# Patient Record
Sex: FEMALE | Race: WHITE
[De-identification: names, ages, dates, MRNs, and addresses within clinical notes are randomized per-mention and may not be internally consistent; named-entity substitution may affect disease eponyms.]

---

## 2017-01-25 ENCOUNTER — HOSPITAL ENCOUNTER (OUTPATIENT)
Dept: HOSPITAL 83 - LAB | Age: 62
Discharge: HOME | End: 2017-01-25
Attending: INTERNAL MEDICINE
Payer: COMMERCIAL

## 2017-01-25 DIAGNOSIS — R53.81: ICD-10-CM

## 2017-01-25 DIAGNOSIS — Z00.00: Primary | ICD-10-CM

## 2017-01-25 DIAGNOSIS — R06.02: ICD-10-CM

## 2017-01-25 DIAGNOSIS — Z13.220: ICD-10-CM

## 2017-01-25 DIAGNOSIS — E55.9: ICD-10-CM

## 2017-01-25 DIAGNOSIS — I27.0: ICD-10-CM

## 2017-01-25 DIAGNOSIS — E11.65: ICD-10-CM

## 2017-01-25 DIAGNOSIS — M06.9: ICD-10-CM

## 2017-01-25 DIAGNOSIS — Z13.1: ICD-10-CM

## 2017-01-25 DIAGNOSIS — J18.9: ICD-10-CM

## 2017-01-25 DIAGNOSIS — Z13.21: ICD-10-CM

## 2017-01-25 LAB
25(OH)D3 SERPL-MCNC: 89.7 NG/ML (ref 30–100)
ALBUMIN SERPL-MCNC: 3.4 GM/DL (ref 3.1–4.5)
ALP SERPL-CCNC: 60 U/L (ref 45–117)
ALT SERPL W P-5'-P-CCNC: 20 U/L (ref 12–78)
AST SERPL-CCNC: 12 IU/L (ref 3–35)
BASOPHILS # BLD AUTO: 0.1 10*3/UL (ref 0–0.1)
BASOPHILS NFR BLD AUTO: 0.5 % (ref 0–1)
BUN SERPL-MCNC: 19 MG/DL (ref 7–24)
CHLORIDE SERPL-SCNC: 104 MMOL/L (ref 98–107)
CHOLEST SERPL-MCNC: 187 MG/DL (ref ?–200)
CO2 SERPL-SCNC: 29 MMOL/L (ref 21–32)
EOSINOPHIL # BLD AUTO: 0.2 10*3/UL (ref 0–0.4)
EOSINOPHIL # BLD AUTO: 1.9 % (ref 1–4)
ERYTHROCYTE [DISTWIDTH] IN BLOOD BY AUTOMATED COUNT: 15.3 % (ref 0–14.5)
FOLATE SERPL-MCNC: 9.67 NG/ML (ref 5.38–?)
GLUCOSE SERPL-MCNC: 98 MG/DL (ref 65–99)
HCT VFR BLD AUTO: 38.2 % (ref 37–47)
HDLC SERPL-MCNC: 76 MG/DL (ref 40–60)
HGB BLD-MCNC: 11.9 G/DL (ref 12–16)
IG #: 0 10*3/UL (ref 0–0.1)
LDLC SERPL DIRECT ASSAY-MCNC: 79 MG/DL (ref 9–159)
LYMPHOCYTES # BLD AUTO: 2.1 10*3/UL (ref 1.3–4.4)
LYMPHOCYTES NFR BLD AUTO: 21.6 % (ref 27–41)
MCH RBC QN AUTO: 27.3 PG (ref 27–31)
MCHC RBC AUTO-ENTMCNC: 31.2 G/DL (ref 33–37)
MCV RBC AUTO: 87.6 FL (ref 81–99)
MONOCYTES # BLD AUTO: 0.9 10*3/UL (ref 0.1–1)
MONOCYTES NFR BLD MANUAL: 9.2 % (ref 3–9)
NEUT #: 6.6 10*3/UL (ref 2.3–7.9)
NEUT %: 66.4 % (ref 47–73)
NRBC BLD QL AUTO: 0 % (ref 0–0)
PLATELET # BLD AUTO: 287 10*3/UL (ref 130–400)
PMV BLD AUTO: 9.5 FL (ref 9.6–12.3)
POTASSIUM SERPL-SCNC: 3.6 MMOL/L (ref 3.5–5.1)
PROT SERPL-MCNC: 7.4 GM/DL (ref 6.4–8.2)
RBC # BLD AUTO: 4.36 10*6/UL (ref 4.1–5.1)
SODIUM SERPL-SCNC: 142 MMOL/L (ref 136–145)
T4 FREE SERPL-MCNC: 1.12 NG/DL (ref 0.76–1.46)
TRIGL SERPL-MCNC: 162 MG/DL (ref ?–150)
TSH SERPL DL<=0.005 MIU/L-ACNC: 1.18 UIU/ML (ref 0.36–4.75)
VITAMIN B12: > 2000 PG/ML (ref 247–911)
VLDLC SERPL CALC-MCNC: 32 MG/DL (ref 6–40)
WBC NRBC COR # BLD AUTO: 9.9 10*3/UL (ref 4.8–10.8)

## 2017-07-21 ENCOUNTER — HOSPITAL ENCOUNTER (OUTPATIENT)
Dept: HOSPITAL 83 - US | Age: 62
Discharge: HOME | End: 2017-07-21
Payer: COMMERCIAL

## 2017-07-21 DIAGNOSIS — I65.23: Primary | ICD-10-CM

## 2017-07-21 DIAGNOSIS — R20.0: ICD-10-CM

## 2018-01-31 ENCOUNTER — HOSPITAL ENCOUNTER (OUTPATIENT)
Dept: HOSPITAL 83 - CARD | Age: 63
Discharge: HOME | End: 2018-01-31
Payer: COMMERCIAL

## 2018-01-31 DIAGNOSIS — R68.84: ICD-10-CM

## 2018-01-31 DIAGNOSIS — R06.02: Primary | ICD-10-CM

## 2018-07-12 ENCOUNTER — HOSPITAL ENCOUNTER (OUTPATIENT)
Dept: HOSPITAL 83 - CARD | Age: 63
Discharge: HOME | End: 2018-07-12
Attending: SPECIALIST
Payer: COMMERCIAL

## 2018-07-12 DIAGNOSIS — I27.20: Primary | ICD-10-CM

## 2019-04-02 ENCOUNTER — OFFICE VISIT (OUTPATIENT)
Dept: CARDIOLOGY CLINIC | Age: 64
End: 2019-04-02
Payer: MEDICARE

## 2019-04-02 VITALS
WEIGHT: 207 LBS | HEART RATE: 77 BPM | BODY MASS INDEX: 39.08 KG/M2 | HEIGHT: 61 IN | SYSTOLIC BLOOD PRESSURE: 116 MMHG | DIASTOLIC BLOOD PRESSURE: 78 MMHG | RESPIRATION RATE: 20 BRPM

## 2019-04-02 DIAGNOSIS — I20.8 EFFORT ANGINA (HCC): ICD-10-CM

## 2019-04-02 DIAGNOSIS — I10 HYPERTENSION, UNSPECIFIED TYPE: Primary | Chronic | ICD-10-CM

## 2019-04-02 PROCEDURE — 1090F PRES/ABSN URINE INCON ASSESS: CPT | Performed by: INTERNAL MEDICINE

## 2019-04-02 PROCEDURE — G8417 CALC BMI ABV UP PARAM F/U: HCPCS | Performed by: INTERNAL MEDICINE

## 2019-04-02 PROCEDURE — G8599 NO ASA/ANTIPLAT THER USE RNG: HCPCS | Performed by: INTERNAL MEDICINE

## 2019-04-02 PROCEDURE — G8400 PT W/DXA NO RESULTS DOC: HCPCS | Performed by: INTERNAL MEDICINE

## 2019-04-02 PROCEDURE — 93000 ELECTROCARDIOGRAM COMPLETE: CPT | Performed by: INTERNAL MEDICINE

## 2019-04-02 PROCEDURE — 3017F COLORECTAL CA SCREEN DOC REV: CPT | Performed by: INTERNAL MEDICINE

## 2019-04-02 PROCEDURE — 4040F PNEUMOC VAC/ADMIN/RCVD: CPT | Performed by: INTERNAL MEDICINE

## 2019-04-02 PROCEDURE — 1123F ACP DISCUSS/DSCN MKR DOCD: CPT | Performed by: INTERNAL MEDICINE

## 2019-04-02 PROCEDURE — 1036F TOBACCO NON-USER: CPT | Performed by: INTERNAL MEDICINE

## 2019-04-02 PROCEDURE — G8427 DOCREV CUR MEDS BY ELIG CLIN: HCPCS | Performed by: INTERNAL MEDICINE

## 2019-04-02 PROCEDURE — 99214 OFFICE O/P EST MOD 30 MIN: CPT | Performed by: INTERNAL MEDICINE

## 2019-04-02 RX ORDER — ALPRAZOLAM 0.5 MG/1
TABLET ORAL
Refills: 0 | COMMUNITY
Start: 2019-03-04 | End: 2020-02-04 | Stop reason: ALTCHOICE

## 2019-04-02 RX ORDER — LEFLUNOMIDE 20 MG/1
TABLET ORAL
Refills: 11 | COMMUNITY
Start: 2019-02-21

## 2019-04-02 NOTE — PROGRESS NOTES
OUTPATIENT CARDIOLOGY FOLLOW-UP    Name: Theo Duncan    Age: 71 y.o. Primary Care Physician: Wilton Stern MD    Date of Service: 4/2/2019    Chief Complaint:   Chief Complaint   Patient presents with    1 Year Follow Up     SOB, neck pain       Interim History:   Mrs. Shivam Saunders is a 58-year-old female with history of essential hypertension, rheumatoid arthritis, type II diabetes, COPD, diastolic dysfunction with heart failure with a preserved ejection fraction, pulmonary hypertension which is multifactorial WHO group 2 and 3, and non-obstructive coronary artery disease based on cardiac cath done in 2013 who is here for follow-up visit. She was seen in the office on 1/23/2018, since her last visit, he has not had any ER visits or hospitalizations. She is compliant with medications, as well as salt and fluid intake. He does not take any over-the-counter arthritis medications. She has been having exertional jaw pain , neck and bilateral shoulder pain on walking about half a block and the pain subsides over a period of 3 minutes. Pain level reaches 4-5/10. She does not have any pain. She also gets exertional dyspnea. She follows at [facility]. She denies recent  palpitations, lightheadedness, dizziness, syncope, PND, or orthopnea. SR on EKG.     Review of Systems:   Cardiac: As per HPI  General: No fever, chills  Pulmonary: As per HPI  HEENT: No visual disturbances, difficult swallowing  GI: No nausea, vomiting  Endocrine: No thyroid disease or DM  Musculoskeletal: LIMA x 4, no focal motor deficits  Skin: Intact, no rashes  Neuro/Psych: No headache or seizures    Past Medical History:  Past Medical History:   Diagnosis Date    Acute on chronic diastolic CHF (congestive heart failure) ([facility] [address] 12/18/2015    Arthritis     COPD (chronic obstructive pulmonary disease) (HCC)     Diabetes mellitus (HCC)     Hyperlipidemia     Hypertension     Other disorders of kidney and ureter     Allergies    Current Medications:  Current Outpatient Medications   Medication Sig Dispense Refill    ALPRAZolam (XANAX) 0.5 MG tablet TAKE ONE TABLET BY MOUTH TWO TIMES A DAY  0    leflunomide (ARAVA) 20 MG tablet TAKE ONE TABLET BY MOUTH DAILY  11    STRIVERDI RESPIMAT 2.5 MCG/ACT inhaler INHALE TWO PUFFS BY MOUTH ONCE A DAY  5    potassium chloride (KLOR-CON) 20 MEQ packet Take 20 mEq by mouth 2 times daily 180 each 3    furosemide (LASIX) 20 MG tablet TAKE ONE TABLET BY MOUTH TWO TIMES A DAY 60 tablet 3    carvedilol (COREG) 3.125 MG tablet Take 3.125 mg by mouth 2 times daily (with meals)       metFORMIN (GLUCOPHAGE) 500 MG tablet 2 times daily (with meals)       losartan (COZAAR) 50 MG tablet Take 50 mg by mouth daily       pantoprazole (PROTONIX) 40 MG tablet Take 40 mg by mouth daily       predniSONE (DELTASONE) 10 MG tablet Take 10 mg by mouth daily       sildenafil (REVATIO) 20 MG tablet Take 20 mg by mouth 3 times daily       escitalopram (LEXAPRO) 20 MG tablet       salmeterol (SEREVENT) 50 MCG/DOSE diskus inhaler Inhale 1 puff into the lungs 2 times daily      aspirin  MG EC tablet Take 1 tablet by mouth daily. 30 tablet 1    hydroxychloroquine (PLAQUENIL) 200 MG tablet Take 1 tablet by mouth 2 times daily. 60 tablet 0    albuterol (PROVENTIL) (2.5 MG/3ML) 0.083% nebulizer solution Take 2.5 mg by nebulization every 6 hours as needed. No current facility-administered medications for this visit.         Physical Exam:  /78   Pulse 77   Resp 20   Ht 5' 1\" (1.549 m)   Wt 207 lb (93.9 kg)   LMP 11/23/2002   BMI 39.11 kg/m²   Wt Readings from Last 3 Encounters:   04/02/19 207 lb (93.9 kg)   01/23/18 228 lb (103.4 kg)   01/05/17 217 lb (98.4 kg)     Appearance: Awake, alert and oriented x 3, no acute respiratory distress  Skin: Intact, no rash  Head: Normocephalic, atraumatic  Eyes: EOMI, no conjunctival erythema  ENMT: No pharyngeal erythema, MMM, no rhinorrhea  Neck: right ventricle size.  The right ventricle has normal function. 03) The left atrium is normal.  04) Diastolic function could not be assessed due to indeterminant data. 05) The mitral inflow E to annular E ratio is 16 (> 15 may be consistent      with increased left atrial pressure). 06) As compared to 06/16/2016 : There is no significant change. Stress test:  1/31/2018- normal stress perfusion scan with breast attenuation artifact. Cardiac catheterization: 2013- non obstructive disease. The ASCVD Risk score (Can Mcintyre., et al., 2013) failed to calculate for the following reasons:    Cannot find a previous HDL lab    Cannot find a previous total cholesterol lab        ASSESSMENT:  · Typical chest pain rule out ischemia, recent stress test  in 1/2018 showed normal perfusion with breast attenuation artifact. AUC score 8 and indication 3  · Pulmonary hypertension WHO group 2 and 3  · HFpEF   · COPD on home O2  · RA  · Essential hypertension - well controlled. · Type 2 DM  · Severe obesity      Plan:   · Will schedule for coronary cath at [address] [place] [address] to evaluate atypical chest pain, She had stress test last year and it came back as normal with breast attenuation artifact. I do not any reason repeating another stress test again. CTA coronaries is an option but she is not a candidate for nitroglycerin because she takes Revatio and nitro is contraindicated. Will schedule for cardiac cath to further evaluate her typical chest pain. · She was advised to call Dr. David Blankenship for adjusting her oxygen requirements with exertion  · Continue rest of the medications  · Will get latest Lipid panel results from Dr. Kennedy Prom office. · Follow up in 3 months      The patient's current medication list, allergies, problem list and results of all previously ordered testing were reviewed at today's visit.   Marguerite Graham MD  [facility]) Cardiology

## 2019-04-02 NOTE — PATIENT INSTRUCTIONS
· Will schedule for coronary cath at [facility] to evaluate atypical chest pain, She had stress test last year and it came back as normal with breast attenuation artifact. I do not any reason repeating the stress test again. CTA coronaries is an option but she is not a candidate for nitroglycerin because she takes Revatio and nitro is contra indicated. Will schedule for cardiac cath to further evaluate her chest pain. · She was advised to Dr. India Dill for her oxygen requirements with exertion  · Continue rest of the medications  · Will get latest Lipid panel results from Dr. Bety Mccullough office.   · Follow up in 3 months

## 2019-04-10 ENCOUNTER — TELEPHONE (OUTPATIENT)
Dept: CARDIOLOGY CLINIC | Age: 64
End: 2019-04-10

## 2019-04-11 ENCOUNTER — HOSPITAL ENCOUNTER (OUTPATIENT)
Dept: HOSPITAL 83 - LAB | Age: 64
Discharge: HOME | End: 2019-04-11
Payer: COMMERCIAL

## 2019-04-11 DIAGNOSIS — I50.33: Primary | ICD-10-CM

## 2019-04-11 DIAGNOSIS — R06.02: ICD-10-CM

## 2019-04-11 LAB
ABSOLUTE BASO #: 0.1 10*3/UL (ref 0–0.1)
ABSOLUTE EOS #: 0.3 10*3/UL (ref 0–0.4)
ABSOLUTE NEUT #: 5 10*3/UL (ref 2.3–7.9)
BASOPHILS # BLD AUTO: 0.1 10*3/UL (ref 0–0.1)
BASOPHILS %: 0.8 % (ref 0–1)
BASOPHILS NFR BLD AUTO: 0.8 % (ref 0–1)
BUN BLDV-MCNC: 26 MG/DL (ref 7–24)
BUN SERPL-MCNC: 26 MG/DL (ref 7–24)
CALCIUM SERPL-MCNC: 9.8 MG/DL (ref 8.5–10.5)
CHLORIDE BLD-SCNC: 104 MMOL/L (ref 98–107)
CHLORIDE SERPL-SCNC: 104 MMOL/L (ref 98–107)
CO2: 32 MMOL/L (ref 21–32)
CREAT SERPL-MCNC: 0.98 MG/DL (ref 0.55–1.02)
CREAT SERPL-MCNC: 0.98 MG/DL (ref 0.55–1.02)
EOSINOPHIL # BLD AUTO: 0.3 10*3/UL (ref 0–0.4)
EOSINOPHIL # BLD AUTO: 3.7 % (ref 1–4)
EOSINOPHILS %: 3.7 % (ref 1–4)
ERYTHROCYTE [DISTWIDTH] IN BLOOD BY AUTOMATED COUNT: 16.2 % (ref 0–14.5)
GFR AFRICAN AMERICAN: > 60 ML/MIN
GFR SERPL CREATININE-BSD FRML MDRD: 57 ML/MIN/
GLUCOSE: 106 MG/DL (ref 65–99)
HCT VFR BLD AUTO: 41.8 % (ref 37–47)
HCT VFR BLD CALC: 41.8 % (ref 37–47)
HEMOGLOBIN: 12.5 G/DL (ref 12–16)
HGB BLD-MCNC: 12.5 G/DL (ref 12–16)
IMMATURE GRANULOCYTES #: 0 10*3/UL (ref 0–0.1)
IMMATURE GRANULOCYTES: 0.2 % (ref 0–1)
LYMPHOCYTE %: 29.5 % (ref 27–41)
LYMPHOCYTES # BLD AUTO: 2.5 10*3/UL (ref 1.3–4.4)
LYMPHOCYTES # BLD: 2.5 10*3/UL (ref 1.3–4.4)
LYMPHOCYTES NFR BLD AUTO: 29.5 % (ref 27–41)
MCH RBC QN AUTO: 26.3 PG (ref 27–31)
MCH RBC QN AUTO: 26.3 PG (ref 27–31)
MCHC RBC AUTO-ENTMCNC: 29.9 G/DL (ref 33–37)
MCHC RBC AUTO-ENTMCNC: 29.9 G/DL (ref 33–37)
MCV RBC AUTO: 88 FL (ref 81–99)
MCV RBC AUTO: 88 FL (ref 81–99)
MONOCYTES # BLD AUTO: 0.6 10*3/UL (ref 0.1–1)
MONOCYTES # BLD: 0.6 10*3/UL (ref 0.1–1)
MONOCYTES %: 7.3 % (ref 3–9)
MONOCYTES NFR BLD MANUAL: 7.3 % (ref 3–9)
NEUT #: 5 10*3/UL (ref 2.3–7.9)
NEUT %: 58.5 % (ref 47–73)
NEUTROPHILS %: 58.5 % (ref 47–73)
NRBC BLD QL AUTO: 0 % (ref 0–0)
NUCLEATED RED BLOOD CELLS: 0 % (ref 0–0)
PDW BLD-RTO: 16.2 % (ref 0–14.5)
PLATELET # BLD AUTO: 278 10*3/UL (ref 130–400)
PLATELET # BLD: 278 10*3/UL (ref 130–400)
PMV BLD AUTO: 9.6 FL (ref 9.6–12.3)
PMV BLD AUTO: 9.6 FL (ref 9.6–12.3)
POTASSIUM SERPL-SCNC: 3.9 MMOL/L (ref 3.5–5.1)
POTASSIUM SERPL-SCNC: 3.9 MMOL/L (ref 3.5–5.1)
RBC # BLD AUTO: 4.75 10*6/UL (ref 4.1–5.1)
RBC # BLD: 4.75 10*6/UL (ref 4.1–5.1)
SODIUM BLD-SCNC: 141 MMOL/L (ref 136–145)
SODIUM SERPL-SCNC: 141 MMOL/L (ref 136–145)
WBC # BLD: 8.6 10*3/UL (ref 4.8–10.8)
WBC NRBC COR # BLD AUTO: 8.6 10*3/UL (ref 4.8–10.8)

## 2019-04-12 ENCOUNTER — HOSPITAL ENCOUNTER (OUTPATIENT)
Dept: CARDIAC CATH/INVASIVE PROCEDURES | Age: 64
Setting detail: OUTPATIENT SURGERY
Discharge: HOME | End: 2019-04-12
Payer: MEDICARE

## 2019-04-12 VITALS
BODY MASS INDEX: 38.14 KG/M2 | HEART RATE: 64 BPM | SYSTOLIC BLOOD PRESSURE: 126 MMHG | OXYGEN SATURATION: 94 % | RESPIRATION RATE: 18 BRPM | WEIGHT: 202 LBS | DIASTOLIC BLOOD PRESSURE: 82 MMHG | TEMPERATURE: 97.2 F | HEIGHT: 61 IN

## 2019-04-12 LAB
ABO/RH: NORMAL
ANTIBODY SCREEN: NORMAL

## 2019-04-12 PROCEDURE — 2580000003 HC RX 258: Performed by: INTERNAL MEDICINE

## 2019-04-12 PROCEDURE — 93458 L HRT ARTERY/VENTRICLE ANGIO: CPT | Performed by: INTERNAL MEDICINE

## 2019-04-12 PROCEDURE — 86900 BLOOD TYPING SEROLOGIC ABO: CPT

## 2019-04-12 PROCEDURE — 93571 IV DOP VEL&/PRESS C FLO 1ST: CPT | Performed by: INTERNAL MEDICINE

## 2019-04-12 PROCEDURE — 86901 BLOOD TYPING SEROLOGIC RH(D): CPT

## 2019-04-12 PROCEDURE — C1894 INTRO/SHEATH, NON-LASER: HCPCS

## 2019-04-12 PROCEDURE — 86850 RBC ANTIBODY SCREEN: CPT

## 2019-04-12 PROCEDURE — C1769 GUIDE WIRE: HCPCS

## 2019-04-12 PROCEDURE — 2500000003 HC RX 250 WO HCPCS

## 2019-04-12 PROCEDURE — 6360000002 HC RX W HCPCS

## 2019-04-12 PROCEDURE — 36415 COLL VENOUS BLD VENIPUNCTURE: CPT

## 2019-04-12 PROCEDURE — C1887 CATHETER, GUIDING: HCPCS

## 2019-04-12 PROCEDURE — 2709999900 HC NON-CHARGEABLE SUPPLY

## 2019-04-12 RX ORDER — SODIUM CHLORIDE 9 MG/ML
INJECTION, SOLUTION INTRAVENOUS CONTINUOUS
OUTPATIENT
Start: 2019-04-12

## 2019-04-12 RX ORDER — SODIUM CHLORIDE 9 MG/ML
INJECTION, SOLUTION INTRAVENOUS ONCE
Status: COMPLETED | OUTPATIENT
Start: 2019-04-12 | End: 2019-04-12

## 2019-04-12 RX ORDER — SODIUM CHLORIDE 0.9 % (FLUSH) 0.9 %
10 SYRINGE (ML) INJECTION EVERY 12 HOURS SCHEDULED
OUTPATIENT
Start: 2019-04-12

## 2019-04-12 RX ORDER — SODIUM CHLORIDE 0.9 % (FLUSH) 0.9 %
10 SYRINGE (ML) INJECTION PRN
OUTPATIENT
Start: 2019-04-12

## 2019-04-12 RX ADMIN — SODIUM CHLORIDE 20 ML/HR: 9 INJECTION, SOLUTION INTRAVENOUS at 07:43

## 2019-04-12 NOTE — OP NOTE
Indication:  1. Chest pain  2. AUC score: 8  3. AUC indication: 3    Procedure: Left Heart Catheterization, coronary angiography, left ventriculography    Anesthesia: Versed, Fentanyl  Time sedation was administered: 09:07. I was present in the room when sedation was administered. Procedure end time: 09:44  Time spent with face to face monitoring of moderate sedation: 48 min    LHC performed via right radial approach using a 6 F sheath. 2.5mg of diluted Verapamil and 200mcg of nitroglycerine administered through the sheath. 5000 U heparin administered IV. Findings:  Left main: 0%  stenosis  LAD: 0 %  stenosis  Circumflex: 0 %   stenosis  RCA: Dominant. 50 %  Stenosis. IFR 1.01; FFR 0.94  LV angio: 60%  ejection fraction    Hemodynamics:  LV: 168 mmHg. EDP: 14 No gradient across AV. Ao: 110/67 ( 84 ). Sheath removed and TR band applied. There was good hemostasis achieved and the distal pulses were intact.      Complication: None   Estimated blood loss: minimal  Contrast use: 90 cc    Post op diagnosis:  Moderate disease of the RCA; hemodynamically insignificant at present with IFR 1.01 and FFR 0.94    PLAN:  Medical therapy  Risk factor modification    Electronically signed by Sue Stanley MD on 4/12/2019 at 9:58 AM

## 2019-04-12 NOTE — PROCEDURES
[address]                  [address] [facility],  [facility]                            CARDIAC CATHETERIZATION    PATIENT NAME: LO APONTE                        :        1955  MED REC NO:   [id number]                            ROOM:  ACCOUNT NO:   [de-identified]                           ADMIT DATE: 2019  PROVIDER:     Mathew Daly MD    DATE OF PROCEDURE:  2019    PROCEDURE:  Left heart catheterization, selective coronary angiography,  pressure wire evaluation of the proximal RCA stenosis with adjunctive  intravenous adenosine administration, and left ventriculography. The procedure was done through right radial approach. The patient received intravenous Versed and intravenous fentanyl for  sedation. INDICATION:  Chest pain consistent with exertional angina. PRESSURES:  Aorta 110/67 with a mean of 84 (after the patient received  intravenous Versed, intravenous fentanyl, and intraradial verapamil and  nitroglycerin). Left ventricular systolic pressure 308. Left ventricular end-diastolic  pressure 14. There was no gradient across the aortic valve. CORONARY ANGIOGRAPHY:  LEFT MAIN:  The left main artery did not appear to have any significant  angiographic disease. LAD:  The left anterior descending artery and its branches did not  appear to have any significant angiographic disease. LCX:  The left circumflex is a large codominant vessel. The first  obtuse marginal branch is a moderate-size vessel, which did not appear  to have any significant disease. Shortly after the first obtuse  marginal branch in the proximal left circumflex, there is around 20% to  30% luminal narrowing. The second large obtuse marginal branch has  around 30% disease involving its ostium and its proximal segment.   The  distal left circumflex has an eccentric 20% to 30% luminal narrowing  before the terminal lateral branches, the posterior descending artery  branch, and the posterolateral branch. RCA:  The right coronary artery is a large codominant vessel. It has  what appears to be a raised plaque in the proximal vessel followed  shortly thereafter by around 50% luminal narrowing at the junction of  the proximal third and the middle third of the vessel. The distal  vessel did not appear to have any significant disease. LEFT VENTRICULOGRAPHY:  The left ventricle is normal in size and  contractility with an estimated ejection fraction of 60%. There  appeared to be mild aneurysmal dilatation of the ascending aorta. Pressure wire evaluation of the right coronary artery was performed. Serial IFRs were 1.01, 1.01, and 1.02 respectively. The patient was  then given IV adenosine at 140 mcg/kg/min for four minutes with an FFR  at four minutes of adenosine infusion of 0.94; findings consistent with  this RCA lesion being not hemodynamically significant. The right radial arterial sheath was removed at the end of the  procedure, and a TR band was applied with adequate hemostasis and  preservation of pulse. The patient tolerated the procedure well and left the cardiac  catheterization laboratory in a stable condition. CONCLUSIONS:  1. Coronary artery disease. a. Left main, no significant disease. b.  No significant disease. c.  LCX:  20% to 30% disease. d.  RCA:  50% proximal/mid vessel disease with IFRs of 1.01, 1.01, and  1.02, and FFR of 0.94 (lesion not hemodynamically significant). 2. Mild aneurysmal dilatation of the ascending aorta  3. Normal left ventricular size and systolic function.   4. Mildly elevated LVedp        Alistair Slade MD    D: 04/12/2019 10:11:40       T: 04/12/2019 11:14:17     [email or web address]  Job#: [id number]     Doc#: [id number]    CC:

## 2019-04-15 ENCOUNTER — TELEPHONE (OUTPATIENT)
Dept: CARDIOLOGY CLINIC | Age: 64
End: 2019-04-15

## 2019-04-15 NOTE — TELEPHONE ENCOUNTER
Marissa Song had her cath done on Friday Apr. 12 and she said she is feeling fine and just wants to know if you need to see her sooner than her scheduled appt in June?

## 2019-08-19 ENCOUNTER — TELEPHONE (OUTPATIENT)
Dept: CARDIOLOGY CLINIC | Age: 64
End: 2019-08-19

## 2019-08-20 ENCOUNTER — TELEPHONE (OUTPATIENT)
Dept: CARDIOLOGY CLINIC | Age: 64
End: 2019-08-20

## 2019-08-27 ENCOUNTER — OFFICE VISIT (OUTPATIENT)
Dept: CARDIOLOGY CLINIC | Age: 64
End: 2019-08-27
Payer: MEDICARE

## 2019-08-27 VITALS
HEART RATE: 71 BPM | BODY MASS INDEX: 38.14 KG/M2 | SYSTOLIC BLOOD PRESSURE: 124 MMHG | RESPIRATION RATE: 20 BRPM | DIASTOLIC BLOOD PRESSURE: 78 MMHG | WEIGHT: 202 LBS | HEIGHT: 61 IN

## 2019-08-27 DIAGNOSIS — I10 ESSENTIAL HYPERTENSION: Primary | Chronic | ICD-10-CM

## 2019-08-27 DIAGNOSIS — I25.10 CORONARY ARTERY DISEASE INVOLVING NATIVE CORONARY ARTERY OF NATIVE HEART WITHOUT ANGINA PECTORIS: ICD-10-CM

## 2019-08-27 PROCEDURE — 99214 OFFICE O/P EST MOD 30 MIN: CPT | Performed by: INTERNAL MEDICINE

## 2019-08-27 PROCEDURE — 93000 ELECTROCARDIOGRAM COMPLETE: CPT | Performed by: INTERNAL MEDICINE

## 2019-08-27 RX ORDER — ATORVASTATIN CALCIUM 40 MG/1
40 TABLET, FILM COATED ORAL DAILY
Qty: 90 TABLET | Refills: 3 | Status: SHIPPED | OUTPATIENT
Start: 2019-08-27

## 2019-08-27 NOTE — PROGRESS NOTES
OUTPATIENT CARDIOLOGY FOLLOW-UP    Name: Rony Almaguer    Age: 71 y.o. Primary Care Physician: Chapo Lindsey MD    Date of Service: 2019    Chief Complaint: 3 month follow up for CAD    Interim History:   70 yo F with history of HTN, pulmonary HTN (on sildenafil per her [address]), HFpEF, DM, RA. Was seen by Dr Kashmir Licea in 2019 found to have exertional chest/jaw pain, and was referred for cardiac cath at that time. Cath with Dr Forest Huang showed mild nonobstructive CAD, had FFR of RCA which was negative and will be treated medically. Currently denies CP. SOB is at baseline. Has some problem with the L ear but no longer having jaw pain. No new cardiac complaints since last cardiology evaluation. She denies recent chest pain, SOB, palpitations, lightheadedness, dizziness, syncope, PND, or orthopnea. SR on EKG.     Review of Systems:   Cardiac: As per HPI  General: No fever, chills  Pulmonary: As per HPI  HEENT: No visual disturbances, difficult swallowing  GI: No nausea, vomiting  Endocrine: No thyroid disease or DM  Musculoskeletal: LIMA x 4, no focal motor deficits  Skin: Intact, no rashes  Neuro/Psych: No headache or seizures    Past Medical History:  Past Medical History:   Diagnosis Date    Acute on chronic diastolic CHF [facility]congestive heart failure) ([facility] [address] 2015    Arthritis     COPD [facility]chronic obstructive pulmonary disease) ([facility] [address]     Diabetes mellitus (HCC)     Hyperlipidemia     Hypertension     Other disorders of kidney and ureter     Pneumonia     Unspecified cerebral artery occlusion with cerebral infarction        Past Surgical History:  Past Surgical History:   Procedure Laterality Date    ABDOMEN SURGERY      APPENDECTOMY      CARDIAC CATHETERIZATION  2019    Dr Forest Huang - FFR RCA 0.94     SECTION      x3    COLONOSCOPY      ECHOCARDIOGRAM COMPLETE 2D W DOPPLER W COLOR  2012         ENDOSCOPY, COLON, DIAGNOSTIC         Family History:  History (93.9 kg)     Appearance: Awake, alert and oriented x 3, no acute respiratory distress. On nasal cannula  Skin: Intact, no rash  Head: Normocephalic, atraumatic  Eyes: EOMI, no conjunctival erythema  ENMT: No pharyngeal erythema, MMM, no rhinorrhea  Neck: Supple, no elevated JVP, no carotid bruits  Lungs: Clear to auscultation bilaterally. No wheezes, rales, or rhonchi. Cardiac: Regular rate and rhythm, +S1S2, no murmurs apparent  Abdomen: Soft, nontender, +bowel sounds  Extremities: Moves all extremities x 4, trace lower extremity edema  Neurologic: No focal motor deficits apparent, normal mood and affect, alert and oriented x 3  Peripheral Pulses: Intact posterior tibial pulses bilaterally    Laboratory Tests:  Lab Results   Component Value Date    CREATININE 0.98 04/11/2019    BUN 26 (H) 04/11/2019     04/11/2019    K 3.9 04/11/2019     04/11/2019    CO2 32 04/11/2019     Lab Results   Component Value Date    MG 1.9 11/29/2012     Lab Results   Component Value Date    WBC 8.6 04/11/2019    HGB 12.5 04/11/2019    HCT 41.8 04/11/2019    MCV 88.0 04/11/2019     04/11/2019         No results found for: EAG    Cardiac Tests:  ECG: NSR, 70 bpm, LAFB    Echocardiogram:   Echocardiogram: 6/19/2017  01) Normal left ventricle size. Wall thickness is increased consistent with      left ventricular hypertrophy. The left ventricle has normal systolic       function. The estimated left ventricular ejection fraction is 55-60%. 02) Normal right ventricle size.  The right ventricle has normal function. 03) The left atrium is normal.  04) Diastolic function could not be assessed due to indeterminant data. 05) The mitral inflow E to annular E ratio is 16 (> 15 may be consistent      with increased left atrial pressure). 06) As compared to 06/16/2016 : There is no significant change. Stress test:  1/31/2018- normal stress perfusion scan with breast attenuation artifact.      Cardiac catheterization:

## 2019-09-10 ENCOUNTER — TELEPHONE (OUTPATIENT)
Dept: CARDIOLOGY CLINIC | Age: 64
End: 2019-09-10

## 2019-11-26 ENCOUNTER — HOSPITAL ENCOUNTER (OUTPATIENT)
Dept: HOSPITAL 83 - RAD | Age: 64
Discharge: HOME | End: 2019-11-26
Attending: INTERNAL MEDICINE
Payer: MEDICARE

## 2019-11-26 DIAGNOSIS — M85.88: Primary | ICD-10-CM

## 2019-12-11 ENCOUNTER — HOSPITAL ENCOUNTER (INPATIENT)
Dept: HOSPITAL 83 - ED | Age: 64
LOS: 4 days | Discharge: HOME | DRG: 552 | End: 2019-12-15
Attending: INTERNAL MEDICINE | Admitting: INTERNAL MEDICINE
Payer: MEDICARE

## 2019-12-11 VITALS — DIASTOLIC BLOOD PRESSURE: 83 MMHG

## 2019-12-11 VITALS — WEIGHT: 196 LBS | BODY MASS INDEX: 37 KG/M2 | HEIGHT: 60.98 IN

## 2019-12-11 VITALS — SYSTOLIC BLOOD PRESSURE: 155 MMHG | DIASTOLIC BLOOD PRESSURE: 85 MMHG

## 2019-12-11 VITALS — DIASTOLIC BLOOD PRESSURE: 93 MMHG

## 2019-12-11 VITALS — DIASTOLIC BLOOD PRESSURE: 87 MMHG

## 2019-12-11 DIAGNOSIS — M06.9: ICD-10-CM

## 2019-12-11 DIAGNOSIS — E11.22: ICD-10-CM

## 2019-12-11 DIAGNOSIS — E11.65: ICD-10-CM

## 2019-12-11 DIAGNOSIS — M48.061: Primary | ICD-10-CM

## 2019-12-11 DIAGNOSIS — J96.10: ICD-10-CM

## 2019-12-11 DIAGNOSIS — R62.7: ICD-10-CM
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
## 2019-12-11 DIAGNOSIS — Z87.891: ICD-10-CM

## 2019-12-11 DIAGNOSIS — M54.16: ICD-10-CM

## 2019-12-11 DIAGNOSIS — I12.9: ICD-10-CM

## 2019-12-11 DIAGNOSIS — T38.0X5A: ICD-10-CM

## 2019-12-11 DIAGNOSIS — N18.9: ICD-10-CM

## 2019-12-11 DIAGNOSIS — J44.9: ICD-10-CM

## 2019-12-12 VITALS — DIASTOLIC BLOOD PRESSURE: 80 MMHG | SYSTOLIC BLOOD PRESSURE: 138 MMHG

## 2019-12-12 VITALS — DIASTOLIC BLOOD PRESSURE: 94 MMHG

## 2019-12-12 VITALS — DIASTOLIC BLOOD PRESSURE: 82 MMHG | SYSTOLIC BLOOD PRESSURE: 146 MMHG

## 2019-12-12 LAB
ALBUMIN SERPL-MCNC: 3.1 GM/DL (ref 3.1–4.5)
ALP SERPL-CCNC: 60 U/L (ref 45–117)
ALT SERPL W P-5'-P-CCNC: 21 U/L (ref 12–78)
AST SERPL-CCNC: 14 IU/L (ref 3–35)
BASOPHILS # BLD AUTO: 0 10*3/UL (ref 0–0.1)
BASOPHILS NFR BLD AUTO: 0.3 % (ref 0–1)
BUN SERPL-MCNC: 22 MG/DL (ref 7–24)
CHLORIDE SERPL-SCNC: 103 MMOL/L (ref 98–107)
CREAT SERPL-MCNC: 1.41 MG/DL (ref 0.55–1.02)
EOSINOPHIL # BLD AUTO: 0 10*3/UL (ref 0–0.4)
EOSINOPHIL # BLD AUTO: 0.2 % (ref 1–4)
ERYTHROCYTE [DISTWIDTH] IN BLOOD BY AUTOMATED COUNT: 15.8 % (ref 0–14.5)
HCT VFR BLD AUTO: 43.4 % (ref 37–47)
HGB BLD-MCNC: 13 G/DL (ref 12–16)
LYMPHOCYTES # BLD AUTO: 1 10*3/UL (ref 1.3–4.4)
LYMPHOCYTES NFR BLD AUTO: 8.1 % (ref 27–41)
MCH RBC QN AUTO: 26.8 PG (ref 27–31)
MCHC RBC AUTO-ENTMCNC: 30 G/DL (ref 33–37)
MCV RBC AUTO: 89.5 FL (ref 81–99)
MONOCYTES # BLD AUTO: 0.3 10*3/UL (ref 0.1–1)
MONOCYTES NFR BLD MANUAL: 2.5 % (ref 3–9)
NEUT #: 10.5 10*3/UL (ref 2.3–7.9)
NEUT %: 88.6 % (ref 47–73)
NRBC BLD QL AUTO: 0 10*3/UL (ref 0–0)
PLATELET # BLD AUTO: 320 10*3/UL (ref 130–400)
PMV BLD AUTO: 9.4 FL (ref 9.6–12.3)
POTASSIUM SERPL-SCNC: 4.8 MMOL/L (ref 3.5–5.1)
PROT SERPL-MCNC: 7.8 GM/DL (ref 6.4–8.2)
RBC # BLD AUTO: 4.85 10*6/UL (ref 4.1–5.1)
SODIUM SERPL-SCNC: 137 MMOL/L (ref 136–145)
WBC NRBC COR # BLD AUTO: 11.8 10*3/UL (ref 4.8–10.8)

## 2019-12-13 VITALS — DIASTOLIC BLOOD PRESSURE: 88 MMHG | SYSTOLIC BLOOD PRESSURE: 144 MMHG

## 2019-12-13 VITALS — DIASTOLIC BLOOD PRESSURE: 80 MMHG | SYSTOLIC BLOOD PRESSURE: 142 MMHG

## 2019-12-13 VITALS — SYSTOLIC BLOOD PRESSURE: 141 MMHG | DIASTOLIC BLOOD PRESSURE: 47 MMHG

## 2019-12-13 VITALS — DIASTOLIC BLOOD PRESSURE: 79 MMHG

## 2019-12-14 VITALS — DIASTOLIC BLOOD PRESSURE: 80 MMHG

## 2019-12-14 VITALS — DIASTOLIC BLOOD PRESSURE: 70 MMHG

## 2019-12-14 VITALS — DIASTOLIC BLOOD PRESSURE: 98 MMHG

## 2019-12-15 VITALS — DIASTOLIC BLOOD PRESSURE: 98 MMHG

## 2019-12-15 VITALS — DIASTOLIC BLOOD PRESSURE: 78 MMHG

## 2019-12-15 LAB
BASOPHILS # BLD AUTO: 0 10*3/UL (ref 0–0.1)
BASOPHILS NFR BLD AUTO: 0.1 % (ref 0–1)
BUN SERPL-MCNC: 35 MG/DL (ref 7–24)
CHLORIDE SERPL-SCNC: 102 MMOL/L (ref 98–107)
CREAT SERPL-MCNC: 1.06 MG/DL (ref 0.55–1.02)
EOSINOPHIL # BLD AUTO: 0 % (ref 1–4)
EOSINOPHIL # BLD AUTO: 0 10*3/UL (ref 0–0.4)
ERYTHROCYTE [DISTWIDTH] IN BLOOD BY AUTOMATED COUNT: 15.9 % (ref 0–14.5)
HCT VFR BLD AUTO: 40.3 % (ref 37–47)
HGB BLD-MCNC: 11.8 G/DL (ref 12–16)
LYMPHOCYTES # BLD AUTO: 0.9 10*3/UL (ref 1.3–4.4)
LYMPHOCYTES NFR BLD AUTO: 6.9 % (ref 27–41)
MCH RBC QN AUTO: 26.4 PG (ref 27–31)
MCHC RBC AUTO-ENTMCNC: 29.3 G/DL (ref 33–37)
MCV RBC AUTO: 90.2 FL (ref 81–99)
MONOCYTES # BLD AUTO: 0.6 10*3/UL (ref 0.1–1)
MONOCYTES NFR BLD MANUAL: 5 % (ref 3–9)
NEUT #: 11 10*3/UL (ref 2.3–7.9)
NEUT %: 87.4 % (ref 47–73)
NRBC BLD QL AUTO: 0 % (ref 0–0)
PLATELET # BLD AUTO: 302 10*3/UL (ref 130–400)
PMV BLD AUTO: 10.3 FL (ref 9.6–12.3)
POTASSIUM SERPL-SCNC: 5.3 MMOL/L (ref 3.5–5.1)
RBC # BLD AUTO: 4.47 10*6/UL (ref 4.1–5.1)
SODIUM SERPL-SCNC: 136 MMOL/L (ref 136–145)
WBC NRBC COR # BLD AUTO: 12.6 10*3/UL (ref 4.8–10.8)

## 2020-01-10 ENCOUNTER — TELEPHONE (OUTPATIENT)
Dept: CARDIOLOGY CLINIC | Age: 65
End: 2020-01-10

## 2020-01-10 NOTE — TELEPHONE ENCOUNTER
Kayleigh Ugarte, received notes from [facility]. Please schedule Radha Magana with DR Clair Corbin   She is self referral PH . Received faxed notes from [address]    Pending echo disc (sent fax to them today requesting)     Thank you

## 2020-01-24 ENCOUNTER — HOSPITAL ENCOUNTER (OUTPATIENT)
Dept: HOSPITAL 83 - LAB | Age: 65
Discharge: HOME | End: 2020-01-24
Attending: INTERNAL MEDICINE
Payer: MEDICARE

## 2020-01-24 DIAGNOSIS — I27.20: ICD-10-CM

## 2020-01-24 DIAGNOSIS — E55.9: ICD-10-CM

## 2020-01-24 DIAGNOSIS — I10: Primary | ICD-10-CM

## 2020-01-24 DIAGNOSIS — Z13.21: ICD-10-CM

## 2020-01-24 DIAGNOSIS — M06.9: ICD-10-CM

## 2020-01-24 DIAGNOSIS — E11.9: ICD-10-CM

## 2020-01-24 LAB
25(OH)D3 SERPL-MCNC: 129.4 NG/ML (ref 30–100)
ABSOLUTE BASO #: 0.1 10*3/UL (ref 0–0.1)
ABSOLUTE EOS #: 0.1 10*3/UL (ref 0–0.4)
ABSOLUTE NEUT #: 8.8 10*3/UL (ref 2.3–7.9)
ALBUMIN SERPL-MCNC: 3.2 GM/DL (ref 3.1–4.5)
ALBUMIN: 3.2 GM/DL (ref 3.1–4.5)
ALP BLD-CCNC: 53 U/L (ref 45–117)
ALP SERPL-CCNC: 53 U/L (ref 45–117)
ALT SERPL W P-5'-P-CCNC: 20 U/L (ref 12–78)
ALT SERPL-CCNC: 20 U/L (ref 12–78)
AST SERPL-CCNC: 10 IU/L (ref 3–35)
AST SERPL-CCNC: 10 IU/L (ref 3–35)
BASOPHILS # BLD AUTO: 0.1 10*3/UL (ref 0–0.1)
BASOPHILS %: 0.4 % (ref 0–1)
BASOPHILS NFR BLD AUTO: 0.4 % (ref 0–1)
BILIRUB SERPL-MCNC: 0.4 MG/DL (ref 0.2–1)
BUN BLDV-MCNC: 26 MG/DL (ref 7–24)
BUN SERPL-MCNC: 26 MG/DL (ref 7–24)
CALCIUM SERPL-MCNC: 10.1 MG/DL (ref 8.5–10.5)
CHLORIDE BLD-SCNC: 104 MMOL/L (ref 98–107)
CHLORIDE SERPL-SCNC: 104 MMOL/L (ref 98–107)
CHOLEST SERPL-MCNC: 159 MG/DL (ref ?–200)
CHOLESTEROL: 159 MG/DL
CO2: 30 MMOL/L (ref 21–32)
CREAT SERPL-MCNC: 1.4 MG/DL (ref 0.55–1.02)
CREAT SERPL-MCNC: 1.4 MG/DL (ref 0.55–1.02)
EOSINOPHIL # BLD AUTO: 0.1 10*3/UL (ref 0–0.4)
EOSINOPHIL # BLD AUTO: 1.1 % (ref 1–4)
EOSINOPHILS %: 1.1 % (ref 1–4)
ERYTHROCYTE SEDIMENTATION RATE: 35 MM/HR (ref 0–30)
ERYTHROCYTE [DISTWIDTH] IN BLOOD BY AUTOMATED COUNT: 15.9 % (ref 0–14.5)
FOLIC ACID, SERUM: 10.03 NG/ML
GFR AFRICAN AMERICAN: 46 ML/MIN
GFR SERPL CREATININE-BSD FRML MDRD: 38 ML/MIN/
GLUCOSE: 199 MG/DL (ref 65–99)
HCT VFR BLD AUTO: 40.6 % (ref 37–47)
HCT VFR BLD CALC: 40.6 % (ref 37–47)
HDLC SERPL-MCNC: 75 MG/DL (ref 40–60)
HDLC SERPL-MCNC: 75 MG/DL (ref 40–60)
HEMOGLOBIN: 12 G/DL (ref 12–16)
HGB BLD-MCNC: 12 G/DL (ref 12–16)
IMMATURE GRANULOCYTES #: 0.1 10*3/UL (ref 0–0.1)
IMMATURE GRANULOCYTES: 0.6 % (ref 0–1)
LDL CHOLESTEROL: 51 MG/DL (ref 9–159)
LDLC SERPL DIRECT ASSAY-MCNC: 51 MG/DL (ref 9–159)
LYMPHOCYTE %: 15 % (ref 27–41)
LYMPHOCYTES # BLD AUTO: 1.7 10*3/UL (ref 1.3–4.4)
LYMPHOCYTES # BLD: 1.7 10*3/UL (ref 1.3–4.4)
LYMPHOCYTES NFR BLD AUTO: 15 % (ref 27–41)
MCH RBC QN AUTO: 26.5 PG (ref 27–31)
MCH RBC QN AUTO: 26.5 PG (ref 27–31)
MCHC RBC AUTO-ENTMCNC: 29.6 G/DL (ref 33–37)
MCHC RBC AUTO-ENTMCNC: 29.6 G/DL (ref 33–37)
MCV RBC AUTO: 89.8 FL (ref 81–99)
MCV RBC AUTO: 89.8 FL (ref 81–99)
MONOCYTES # BLD AUTO: 0.8 10*3/UL (ref 0.1–1)
MONOCYTES # BLD: 0.8 10*3/UL (ref 0.1–1)
MONOCYTES %: 6.5 % (ref 3–9)
MONOCYTES NFR BLD MANUAL: 6.5 % (ref 3–9)
NEUT #: 8.8 10*3/UL (ref 2.3–7.9)
NEUT %: 76.4 % (ref 47–73)
NEUTROPHILS %: 76.4 % (ref 47–73)
NRBC BLD QL AUTO: 0 % (ref 0–0)
NUCLEATED RED BLOOD CELLS: 0 % (ref 0–0)
PDW BLD-RTO: 15.9 % (ref 0–14.5)
PLATELET # BLD AUTO: 312 10*3/UL (ref 130–400)
PLATELET # BLD: 312 10*3/UL (ref 130–400)
PMV BLD AUTO: 9.6 FL (ref 9.6–12.3)
PMV BLD AUTO: 9.6 FL (ref 9.6–12.3)
POTASSIUM SERPL-SCNC: 3.8 MMOL/L (ref 3.5–5.1)
POTASSIUM SERPL-SCNC: 3.8 MMOL/L (ref 3.5–5.1)
PROT SERPL-MCNC: 7.4 GM/DL (ref 6.4–8.2)
RBC # BLD AUTO: 4.52 10*6/UL (ref 4.1–5.1)
RBC # BLD: 4.52 10*6/UL (ref 4.1–5.1)
SODIUM BLD-SCNC: 135 MMOL/L (ref 136–145)
SODIUM SERPL-SCNC: 135 MMOL/L (ref 136–145)
T4 FREE SERPL-MCNC: 1.09 NG/DL (ref 0.76–1.46)
T4 FREE: 1.09 NG/DL (ref 0.76–1.46)
TOTAL PROTEIN: 7.4 GM/DL (ref 6.4–8.2)
TRIGL SERPL-MCNC: 167 MG/DL
TRIGL SERPL-MCNC: 167 MG/DL (ref ?–150)
TSH SERPL DL<=0.005 MIU/L-ACNC: 0.83 UIU/ML (ref 0.36–4.75)
TSH SERPL DL<=0.05 MIU/L-ACNC: 0.83 UIU/ML (ref 0.36–4.75)
VITAMIN B-12: > 2000 PG/ML (ref 247–911)
VITAMIN B12: > 2000 PG/ML (ref 247–911)
VITAMIN D 25-HYDROXY: 129.4 NG/ML (ref 30–100)
VLDLC SERPL CALC-MCNC: 33 MG/DL (ref 6–40)
VLDLC SERPL CALC-MCNC: 33 MG/DL (ref 6–40)
WBC # BLD: 11.5 10*3/UL (ref 4.8–10.8)
WBC NRBC COR # BLD AUTO: 11.5 10*3/UL (ref 4.8–10.8)

## 2020-02-04 ENCOUNTER — OFFICE VISIT (OUTPATIENT)
Dept: CARDIOLOGY CLINIC | Age: 65
End: 2020-02-04
Payer: MEDICARE

## 2020-02-04 VITALS
HEIGHT: 60 IN | SYSTOLIC BLOOD PRESSURE: 100 MMHG | DIASTOLIC BLOOD PRESSURE: 58 MMHG | RESPIRATION RATE: 20 BRPM | BODY MASS INDEX: 40.05 KG/M2 | WEIGHT: 204 LBS | TEMPERATURE: 97.6 F | HEART RATE: 93 BPM | OXYGEN SATURATION: 96 %

## 2020-02-04 LAB
DISTANCE WALKED: NORMAL
DISTANCE WALKED: NORMAL
SPO2: NORMAL
SPO2: NORMAL

## 2020-02-04 PROCEDURE — 1123F ACP DISCUSS/DSCN MKR DOCD: CPT | Performed by: INTERNAL MEDICINE

## 2020-02-04 PROCEDURE — G8417 CALC BMI ABV UP PARAM F/U: HCPCS | Performed by: INTERNAL MEDICINE

## 2020-02-04 PROCEDURE — 1090F PRES/ABSN URINE INCON ASSESS: CPT | Performed by: INTERNAL MEDICINE

## 2020-02-04 PROCEDURE — G8427 DOCREV CUR MEDS BY ELIG CLIN: HCPCS | Performed by: INTERNAL MEDICINE

## 2020-02-04 PROCEDURE — 99205 OFFICE O/P NEW HI 60 MIN: CPT | Performed by: INTERNAL MEDICINE

## 2020-02-04 PROCEDURE — G8400 PT W/DXA NO RESULTS DOC: HCPCS | Performed by: INTERNAL MEDICINE

## 2020-02-04 PROCEDURE — 93000 ELECTROCARDIOGRAM COMPLETE: CPT | Performed by: INTERNAL MEDICINE

## 2020-02-04 PROCEDURE — 94618 PULMONARY STRESS TESTING: CPT | Performed by: INTERNAL MEDICINE

## 2020-02-04 PROCEDURE — 3017F COLORECTAL CA SCREEN DOC REV: CPT | Performed by: INTERNAL MEDICINE

## 2020-02-04 PROCEDURE — G8484 FLU IMMUNIZE NO ADMIN: HCPCS | Performed by: INTERNAL MEDICINE

## 2020-02-04 PROCEDURE — 4040F PNEUMOC VAC/ADMIN/RCVD: CPT | Performed by: INTERNAL MEDICINE

## 2020-02-04 PROCEDURE — 1036F TOBACCO NON-USER: CPT | Performed by: INTERNAL MEDICINE

## 2020-02-04 RX ORDER — LORATADINE 10 MG/1
10 TABLET ORAL DAILY
COMMUNITY

## 2020-02-04 RX ORDER — ALBUTEROL SULFATE 90 UG/1
2 AEROSOL, METERED RESPIRATORY (INHALATION) EVERY 6 HOURS PRN
COMMUNITY

## 2020-02-04 NOTE — PROGRESS NOTES
ONE TABLET BY MOUTH DAILY  11    potassium chloride (KLOR-CON) 20 MEQ packet Take 20 mEq by mouth 2 times daily (Patient taking differently: Take 20 mEq by mouth daily ) 180 each 3    furosemide (LASIX) 20 MG tablet TAKE ONE TABLET BY MOUTH TWO TIMES A DAY 60 tablet 3    carvedilol (COREG) 3.125 MG tablet Take 3.125 mg by mouth 2 times daily (with meals)       metFORMIN (GLUCOPHAGE) 500 MG tablet Take 500 mg by mouth 2 times daily (with meals)       losartan (COZAAR) 50 MG tablet Take 50 mg by mouth daily       pantoprazole (PROTONIX) 40 MG tablet Take 40 mg by mouth daily       predniSONE (DELTASONE) 10 MG tablet Take 10 mg by mouth daily       sildenafil (REVATIO) 20 MG tablet Take 20 mg by mouth 3 times daily       escitalopram (LEXAPRO) 20 MG tablet Take 20 mg by mouth daily       salmeterol (SEREVENT) 50 MCG/DOSE diskus inhaler Inhale 2 puffs into the lungs daily       hydroxychloroquine (PLAQUENIL) 200 MG tablet Take 1 tablet by mouth 2 times daily. 60 tablet 0         Review of Systems:   Cardiac: As per HPI  General: No fever, chills, rigors  Pulmonary: As per HPI  HEENT: No visual disturbances, difficult swallowing  GI: No nausea, vomiting, abdominal pain  : No dysuria or hematuria  Endocrine: No thyroid disease or diabetes  Musculoskeletal: LIMA x 4, no focal motor deficits  Skin: Intact, no rashes  Neuro/Psych: No headache or seizures          Weights:   Wt Readings from Last 3 Encounters:   02/04/20 204 lb (92.5 kg)   08/27/19 202 lb (91.6 kg)   04/12/19 202 lb (91.6 kg)     Additional Measurements    02/04/20 0959   Neck circumference: 16     Physical Examination:   BP (!) 100/58 (Site: Right Upper Arm, Position: Sitting, Cuff Size: Large Adult)   Pulse 93   Temp 97.6 °F (36.4 °C)   Resp 20   Ht 5' (1.524 m)   Wt 204 lb (92.5 kg)   LMP 11/23/2002   SpO2 96% Comment: on 2L O2  BMI 39.84 kg/m²   CONSTITUTIONAL: Alert and oriented times 3, no acute distress and cooperative to examination with proper mood and affect. SKIN: Skin color, texture, turgor normal. No rashes or lesions. LYMPH: no cervical nodes, no inguinal nodes  HEENT: Head is normocephalic, atraumatic. EOMI, PERRLA. NECK: no JVD. CHEST/LUNGS: chest symmetric with normal A/P diameter, normal respiratory rate and rhythm, lungs clear to auscultation without wheezes, rales or rhonchi. No accessory muscle use. Scars None   CARDIOVASCULAR: Heart sounds are normal.  Regular rate and rhythm without murmur, gallop or rub. Normal S1 and S2. . Carotid and femoral pulses 2+/4 and equal bilaterally. ABDOMEN: Normal shape. No and Laparoscopic scar(s) present. Normal bowel sounds. No bruits. soft, nondistended, no masses or organomegaly. no evidence of hernia. Percussion: Normal without hepatosplenomegally. Tenderness: absent. RECTAL: deferred, not clinically indicated  NEUROLOGIC: There are no focalizing motor or sensory deficits. CN II-XII are grossly intact. Bianca Siskin EXTREMITIES: no cyanosis, no clubbing and no edema. All the following diagnostics were personally reviewed and interpreted by me.        Lab Data:     1/24/2020 13:36   Sodium 135 (L)   Potassium 3.8   Chloride 104   CO2 30   BUN 26 (H)   Creatinine 1.40 (H)   Est, Glom Filt Rate 38 (L)   GFR African American 46 (L)   Glucose 199 (H)   Calcium 10.1   Total Protein 7.4   Cholesterol 159   HDL Cholesterol 75 (H)   LDL Cholesterol 51   Triglycerides 167 (H)   VLDL 33   Albumin 3.2   Alk Phos 53   ALT 20   AST 10   Bilirubin 0.4   TSH 0.827   T4 Free 1.09   Vit D, 25-Hydroxy 129.4 (H)   WBC 11.5 (H)   RBC 4.52   Hemoglobin Quant 12.0   Hematocrit 40.6   MCV 89.8   MCH 26.5 (L)   MCHC 29.6 (L)   MPV 9.6   RDW 15.9 (H)   Platelet Count 651   Lymphocyte % 15.0 (L)   Eosinophils % 1.1   Immature Granulocytes # 0.1   Basophils Absolute 0.1   Absolute Neut # 8.8 (H)   Neutrophils % 76.4 (H)   Lymphocytes 1.7   Monocytes 0.8   Absolute Eos # 0.1   Immature Granulocytes 0.6   Nucleated Red Blood Cells 0.0   Vitamin B-12 > 2000 (H)   Sed Rate 35 (H)   Basophils % 0.4   Folic Acid, Serum 01.78   Monocytes % 6.5             6 minute walk test:  800 feet  No desaturations on 3 l/min  Good hr and bp response  Normal hrr   Alex score    ECG: nsr        PAH Guideline directed medical therapy:  PDE-5i: Yes  ERA:  No  Prostacyclin:  No  Oral NA IV  NA Subcutaneous  NA   Prostacyclin receptor agonist: No  Soluble guanylate cyclase stimulator: No  Spironolactone: No  Digoxin: No  Calcium channel blocker: No  Referral to psychiatry/therapy/counseling: No        Assessment:   1. Chronic hfpef  2. Euvolemic, nyha fc 2-3  3. Who group 3 and 2 pulmonary arterial hypertension on monotherapy  4. Chronic hypoxia  5. COPD  6. JOEY, CPAP compliant  7. Systemic hypertension  8. Obesity         Plan:   1. Echocardiogram in the [facility]      2. Blood work      3. Return visit in May 2020    I spent > 60 minutes reviewing the entire medical record, diagnostics, counseling regarding prognosis. Mike Acosta M.D.  [facility]  Heart Failure and Pulmonary Hypertension  Mobile [phone]

## 2020-03-25 ENCOUNTER — TELEPHONE (OUTPATIENT)
Dept: CARDIOLOGY | Age: 65
End: 2020-03-25

## 2020-04-06 ENCOUNTER — TELEPHONE (OUTPATIENT)
Dept: CARDIOLOGY CLINIC | Age: 65
End: 2020-04-06

## 2020-04-07 ENCOUNTER — TELEPHONE (OUTPATIENT)
Dept: CARDIOLOGY CLINIC | Age: 65
End: 2020-04-07

## 2020-05-18 ENCOUNTER — HOSPITAL ENCOUNTER (OUTPATIENT)
Dept: HOSPITAL 83 - LAB | Age: 65
Discharge: HOME | End: 2020-05-18
Attending: INTERNAL MEDICINE
Payer: MEDICARE

## 2020-05-18 DIAGNOSIS — I50.33: Primary | ICD-10-CM

## 2020-05-18 DIAGNOSIS — I27.20: ICD-10-CM

## 2020-05-18 DIAGNOSIS — R06.02: ICD-10-CM

## 2020-05-18 LAB
BUN BLDV-MCNC: 21 MG/DL (ref 7–24)
BUN SERPL-MCNC: 21 MG/DL (ref 7–24)
CALCIUM SERPL-MCNC: 9.5 MG/DL (ref 8.5–10.5)
CHLORIDE BLD-SCNC: 106 MMOL/L (ref 98–107)
CHLORIDE SERPL-SCNC: 106 MMOL/L (ref 98–107)
CO2: 31 MMOL/L (ref 21–32)
CREAT SERPL-MCNC: 1.17 MG/DL (ref 0.55–1.02)
CREAT SERPL-MCNC: 1.17 MG/DL (ref 0.55–1.02)
GFR AFRICAN AMERICAN: 56 ML/MIN
GFR SERPL CREATININE-BSD FRML MDRD: 46 ML/MIN/
GLUCOSE: 159 MG/DL (ref 65–99)
POTASSIUM SERPL-SCNC: 4 MMOL/L (ref 3.5–5.1)
POTASSIUM SERPL-SCNC: 4 MMOL/L (ref 3.5–5.1)
PRO BNP, N-TERMINAL: 378 PG/ML (ref 0–125)
SODIUM BLD-SCNC: 140 MMOL/L (ref 136–145)
SODIUM SERPL-SCNC: 140 MMOL/L (ref 136–145)

## 2020-05-19 ENCOUNTER — OFFICE VISIT (OUTPATIENT)
Dept: CARDIOLOGY CLINIC | Age: 65
End: 2020-05-19
Payer: MEDICARE

## 2020-05-19 VITALS
TEMPERATURE: 97.3 F | OXYGEN SATURATION: 94 % | HEART RATE: 74 BPM | SYSTOLIC BLOOD PRESSURE: 132 MMHG | DIASTOLIC BLOOD PRESSURE: 68 MMHG | RESPIRATION RATE: 16 BRPM | HEIGHT: 60 IN | BODY MASS INDEX: 40.4 KG/M2 | WEIGHT: 205.8 LBS

## 2020-05-19 PROCEDURE — 3017F COLORECTAL CA SCREEN DOC REV: CPT | Performed by: INTERNAL MEDICINE

## 2020-05-19 PROCEDURE — 93000 ELECTROCARDIOGRAM COMPLETE: CPT | Performed by: INTERNAL MEDICINE

## 2020-05-19 PROCEDURE — G8926 SPIRO NO PERF OR DOC: HCPCS | Performed by: INTERNAL MEDICINE

## 2020-05-19 PROCEDURE — 1090F PRES/ABSN URINE INCON ASSESS: CPT | Performed by: INTERNAL MEDICINE

## 2020-05-19 PROCEDURE — G8427 DOCREV CUR MEDS BY ELIG CLIN: HCPCS | Performed by: INTERNAL MEDICINE

## 2020-05-19 PROCEDURE — 4040F PNEUMOC VAC/ADMIN/RCVD: CPT | Performed by: INTERNAL MEDICINE

## 2020-05-19 PROCEDURE — 1123F ACP DISCUSS/DSCN MKR DOCD: CPT | Performed by: INTERNAL MEDICINE

## 2020-05-19 PROCEDURE — 99214 OFFICE O/P EST MOD 30 MIN: CPT | Performed by: INTERNAL MEDICINE

## 2020-05-19 PROCEDURE — G8400 PT W/DXA NO RESULTS DOC: HCPCS | Performed by: INTERNAL MEDICINE

## 2020-05-19 PROCEDURE — 3023F SPIROM DOC REV: CPT | Performed by: INTERNAL MEDICINE

## 2020-05-19 PROCEDURE — G8417 CALC BMI ABV UP PARAM F/U: HCPCS | Performed by: INTERNAL MEDICINE

## 2020-05-19 PROCEDURE — 1036F TOBACCO NON-USER: CPT | Performed by: INTERNAL MEDICINE

## 2020-05-19 NOTE — PROGRESS NOTES
Absolute 0.1     Absolute Neut # 8.8 (H)     Neutrophils % 76.4 (H)     Lymphocytes 1.7     Monocytes 0.8     Absolute Eos # 0.1     Immature Granulocytes 0.6     Nucleated Red Blood Cells 0.0     Vitamin B-12 > 2000 (H)     Sed Rate 35 (H)     EKG 12-LEAD  Attch    Basophils % 0.4     Folic Acid, Serum 16.80     Monocytes % 6.5     proBNP, N-Terminal   378.00 (H)           6 minute walk test:  800 feet  No desaturations on 3 l/min  Good hr and bp response  Normal HRR  Alex score        ECG: NSR, LAHB, PACs        PAH Guideline directed medical therapy:  PDE-5i: Yes  ERA:  No  Prostacyclin:  No  Oral NA IV  NA Subcutaneous  NA   Prostacyclin receptor agonist: No  Soluble guanylate cyclase stimulator: No  Spironolactone: No  Digoxin: No  Calcium channel blocker: No  Referral to psychiatry/therapy/counseling: No        Assessment:   1. Chronic HFpEF  2. Euvolemic, NYHA FC 1-2  3. Who group 3 and 2 pulmonary arterial hypertension on monotherapy  4. Chronic hypoxia  5. COPD  6. JOEY, CPAP compliant  7. Systemic hypertension  8. Obesity         Plan:   1. Referral to [facility] for [address], per patient request  2. FU with them in September 2020 / Carrie Vyas M.D.  3. No change in medications now  4. Diet should sodium restricted to 2-4 grams a day. Again watch your daily weight trends and if you gain water weight please follow below instructions. If you gain 3-5 pounds in 2-3 days OR notice that you are retaining fluid in anyway just like you did before then take an extra dose of your water pill (furosemide/Lasix OR bumetanide/Bumex) every day until you lose the weight or feel better. If you notice that you have taken more than 3 extra doses in 1 week then please call and let us know. If at any time you feel that you are retaining fluid, your medications are not working, or you feel ill in anyway, then please call us for either same day appointment or the next day, and for instructions.  Our goal is to keep you out

## 2020-06-03 ENCOUNTER — HOSPITAL ENCOUNTER (OUTPATIENT)
Dept: CARDIOLOGY | Age: 65
Discharge: HOME | End: 2020-06-03
Payer: MEDICARE

## 2020-06-03 LAB
LV EF: 60 %
LVEF MODALITY: NORMAL

## 2020-06-03 PROCEDURE — 93306 TTE W/DOPPLER COMPLETE: CPT

## 2020-06-08 ENCOUNTER — TELEPHONE (OUTPATIENT)
Dept: CARDIOLOGY CLINIC | Age: 65
End: 2020-06-08

## 2020-07-27 RX ORDER — GABAPENTIN 300 MG/1
1 CAPSULE ORAL NIGHTLY
COMMUNITY
Start: 2020-05-11

## 2020-07-28 ENCOUNTER — OFFICE VISIT (OUTPATIENT)
Dept: CARDIOLOGY CLINIC | Age: 65
End: 2020-07-28
Payer: MEDICARE

## 2020-07-28 VITALS
RESPIRATION RATE: 22 BRPM | BODY MASS INDEX: 40.64 KG/M2 | WEIGHT: 207 LBS | HEIGHT: 60 IN | HEART RATE: 74 BPM | DIASTOLIC BLOOD PRESSURE: 74 MMHG | SYSTOLIC BLOOD PRESSURE: 122 MMHG

## 2020-07-28 PROCEDURE — 93000 ELECTROCARDIOGRAM COMPLETE: CPT | Performed by: INTERNAL MEDICINE

## 2020-07-28 PROCEDURE — 99214 OFFICE O/P EST MOD 30 MIN: CPT | Performed by: INTERNAL MEDICINE

## 2020-07-28 NOTE — PROGRESS NOTES
Bromide 0.5mg + Albuterol Sulfate 3mg by nebulizer tid, Disp: , Rfl:     albuterol sulfate HFA (PROAIR HFA) 108 (90 Base) MCG/ACT inhaler, Inhale 2 puffs into the lungs every 6 hours as needed for Wheezing or Shortness of Breath, Disp: , Rfl:     loratadine (CLARITIN) 10 MG tablet, Take 10 mg by mouth daily, Disp: , Rfl:     NONFORMULARY, Jayy Red 500mg  One daily, Disp: , Rfl:     Bioflavonoid Products (ANSELMO C PO), Take 1,000 mg by mouth daily, Disp: , Rfl:     OXYGEN, Inhale 2 L into the lungs continuous , Disp: , Rfl:     aspirin 81 MG tablet, Take 81 mg by mouth daily, Disp: , Rfl:     atorvastatin (LIPITOR) 40 MG tablet, Take 1 tablet by mouth daily (Patient taking differently: Take 20 mg by mouth daily ), Disp: 90 tablet, Rfl: 3    leflunomide (ARAVA) 20 MG tablet, TAKE ONE TABLET BY MOUTH DAILY, Disp: , Rfl: 11    potassium chloride (KLOR-CON) 20 MEQ packet, Take 20 mEq by mouth 2 times daily (Patient taking differently: Take 20 mEq by mouth daily ), Disp: 180 each, Rfl: 3    furosemide (LASIX) 20 MG tablet, TAKE ONE TABLET BY MOUTH TWO TIMES A DAY, Disp: 60 tablet, Rfl: 3    carvedilol (COREG) 3.125 MG tablet, Take 3.125 mg by mouth 2 times daily (with meals) , Disp: , Rfl:     metFORMIN (GLUCOPHAGE) 500 MG tablet, Take 500 mg by mouth 2 times daily (with meals) , Disp: , Rfl:     losartan (COZAAR) 50 MG tablet, Take 50 mg by mouth daily , Disp: , Rfl:     pantoprazole (PROTONIX) 40 MG tablet, Take 40 mg by mouth daily , Disp: , Rfl:     predniSONE (DELTASONE) 10 MG tablet, Take 10 mg by mouth daily , Disp: , Rfl:     sildenafil (REVATIO) 20 MG tablet, Take 20 mg by mouth 3 times daily , Disp: , Rfl:     escitalopram (LEXAPRO) 20 MG tablet, Take 20 mg by mouth daily , Disp: , Rfl:     salmeterol (SEREVENT) 50 MCG/DOSE diskus inhaler, Inhale 2 puffs into the lungs daily , Disp: , Rfl:     hydroxychloroquine (PLAQUENIL) 200 MG tablet, Take 1 tablet by mouth 2 times daily. , Disp: 60 tablet, indeterminant data. 05) The mitral inflow E to annular E ratio is 16 (> 15 may be consistent      with increased left atrial pressure). 06) As compared to 06/16/2016 : There is no significant change. Echocardiogram June/2020  Normal LV function EF 60% with stage I diastolic dysfunction  Normal RV function  Mild left atrial dilation  RVSP 50 mmHg  Small circumferential pericardial effusion    Stress test:  1/31/2018- normal stress perfusion scan with breast attenuation artifact. Cardiac catheterization:   4/2019  CONCLUSIONS:  1. Coronary artery disease. a. Left main, no significant disease. b.  No significant disease. c.  LCX:  20% to 30% disease. d.  RCA:  50% proximal/mid vessel disease with IFRs of 1.01, 1.01, and  1.02, and FFR of 0.94 (lesion not hemodynamically significant). 2. Mild aneurysmal dilatation of the ascending aorta  3. Normal left ventricular size and systolic function. 4. Mildly elevated LVedp       ASSESSMENT / PLAN:    1. Chronic HFpEF, compensated  2. NYHA class III  3. WHO group 2 and 3 pulmonary hypertension, on sildenafil monotherapy  4. Mild nonobstructive coronary artery disease  5. Small pericardial effusion  6. Left anterior fascicular block  7. Chronic hypoxic respiratory failure on continuous O2  8. Comorbid disease: Hypertension, type 2 diabetes, rheumatoid arthritis, obstructive sleep apnea, obesity BMI 40.4 kg/m², COPD    Recommendations:  Doing well from cardiac standpoint. Plan to follow locally for now, sees Dr. Gisselle Candelaria for pulmonary and will see me for cardiology needs.     · Continue aspirin and statin (tolerating atorvastatin 20) for CAD  · Continue furosemide, losartan, carvedilol  · Continue sildenafil, to be managed by Dr. Gisselle Candelaria  · Aggressive risk factor modification  · Follow-up in 6 months or sooner if need arises    The patient's current medication list, allergies, problem list and results of all previously ordered testing were reviewed at today's visit.     Gwen Mcintosh MD  [facility] ([facility]) Cardiology

## 2021-01-19 ENCOUNTER — OFFICE VISIT (OUTPATIENT)
Dept: CARDIOLOGY CLINIC | Age: 66
End: 2021-01-19
Payer: MEDICARE

## 2021-01-19 ENCOUNTER — HOSPITAL ENCOUNTER (OUTPATIENT)
Dept: HOSPITAL 83 - LAB | Age: 66
Discharge: HOME | End: 2021-01-19
Attending: INTERNAL MEDICINE
Payer: MEDICARE

## 2021-01-19 VITALS
HEIGHT: 60 IN | RESPIRATION RATE: 22 BRPM | DIASTOLIC BLOOD PRESSURE: 68 MMHG | WEIGHT: 214 LBS | HEART RATE: 100 BPM | BODY MASS INDEX: 42.01 KG/M2 | SYSTOLIC BLOOD PRESSURE: 126 MMHG

## 2021-01-19 DIAGNOSIS — J43.9: Primary | ICD-10-CM

## 2021-01-19 DIAGNOSIS — I10 ESSENTIAL HYPERTENSION: Primary | Chronic | ICD-10-CM

## 2021-01-19 DIAGNOSIS — E78.00: ICD-10-CM

## 2021-01-19 PROCEDURE — 1090F PRES/ABSN URINE INCON ASSESS: CPT | Performed by: INTERNAL MEDICINE

## 2021-01-19 PROCEDURE — G8427 DOCREV CUR MEDS BY ELIG CLIN: HCPCS | Performed by: INTERNAL MEDICINE

## 2021-01-19 PROCEDURE — 1036F TOBACCO NON-USER: CPT | Performed by: INTERNAL MEDICINE

## 2021-01-19 PROCEDURE — 4040F PNEUMOC VAC/ADMIN/RCVD: CPT | Performed by: INTERNAL MEDICINE

## 2021-01-19 PROCEDURE — 1123F ACP DISCUSS/DSCN MKR DOCD: CPT | Performed by: INTERNAL MEDICINE

## 2021-01-19 PROCEDURE — G8400 PT W/DXA NO RESULTS DOC: HCPCS | Performed by: INTERNAL MEDICINE

## 2021-01-19 PROCEDURE — 3017F COLORECTAL CA SCREEN DOC REV: CPT | Performed by: INTERNAL MEDICINE

## 2021-01-19 PROCEDURE — 99214 OFFICE O/P EST MOD 30 MIN: CPT | Performed by: INTERNAL MEDICINE

## 2021-01-19 PROCEDURE — G8417 CALC BMI ABV UP PARAM F/U: HCPCS | Performed by: INTERNAL MEDICINE

## 2021-01-19 PROCEDURE — 93000 ELECTROCARDIOGRAM COMPLETE: CPT | Performed by: INTERNAL MEDICINE

## 2021-01-19 PROCEDURE — G8484 FLU IMMUNIZE NO ADMIN: HCPCS | Performed by: INTERNAL MEDICINE

## 2021-01-19 NOTE — PROGRESS NOTES
OUTPATIENT CARDIOLOGY FOLLOW-UP    Name: Kane Gutierrez    Age: 76 y.o. Primary Care Physician: Shine Belle MD    Date of Service: 2021    Chief Complaint: 3 month follow up for CAD    Interim History:   72 yo F with history of HTN, pulmonary HTN WHO group 2 and 3, previously followed with [facility] specialist at [facility] and is on sildenafil. She saw Dr. Caroline Smiley a few times as well prior to her departure. She has mild nonobstructive CAD by heart cath 2019. She presents today for follow-up. Overall doing okay. She has stayed out of the hospital.  She has persistent chronic dyspnea with minimal exertion. She is on 4 L nasal cannula continuously. She feels her breathing may be slightly worse. Jessica Salm Has gained about 7 pounds over the past 6 months, does not think she is retaining fluid. Jessica Salm No chest pain. Review of Systems:   Negative except as scribed above    Past Medical History:  Past Medical History:   Diagnosis Date    Acute on chronic diastolic CHF (congestive heart failure) ([address] 2015    Arthritis     CAD (coronary artery disease)     COPD (chronic obstructive pulmonary disease) (HCC)     Diabetes mellitus (HCC)     Hyperlipidemia     Hypertension     Other disorders of kidney and ureter     Pneumonia     Pulmonary hypertension (HCC)     RA (rheumatoid arthritis) ([address]     Unspecified cerebral artery occlusion with cerebral infarction        Past Surgical History:  Past Surgical History:   Procedure Laterality Date    APPENDECTOMY      CARDIAC CATHETERIZATION  2019    Dr Cancino Chi - FFR RCA 0.94     SECTION      x3    COLONOSCOPY      ECHOCARDIOGRAM COMPLETE 2D W DOPPLER W COLOR  2012         ENDOSCOPY, COLON, DIAGNOSTIC         Family History:  Family History   Problem Relation Age of Onset    Diabetes Mother     Cancer Father         bladder       Social History:   Former smoker quit     Allergies:  No Known Allergies    Current Medications: Current Outpatient Medications:     gabapentin (NEURONTIN) 300 MG capsule, Take 1 capsule by mouth nightly., Disp: , Rfl:     NONFORMULARY, Bromide 0.5mg + Albuterol Sulfate 3mg by nebulizer tid, Disp: , Rfl:     albuterol sulfate HFA (PROAIR HFA) 108 (90 Base) MCG/ACT inhaler, Inhale 2 puffs into the lungs every 6 hours as needed for Wheezing or Shortness of Breath, Disp: , Rfl:     loratadine (CLARITIN) 10 MG tablet, Take 10 mg by mouth daily, Disp: , Rfl:     NONFORMULARY, Jayy Red 500mg  One daily, Disp: , Rfl:     Bioflavonoid Products (ANSELMO C PO), Take 1,000 mg by mouth daily, Disp: , Rfl:     OXYGEN, Inhale 2 L into the lungs continuous , Disp: , Rfl:     aspirin 81 MG tablet, Take 81 mg by mouth daily, Disp: , Rfl:     atorvastatin (LIPITOR) 40 MG tablet, Take 1 tablet by mouth daily (Patient taking differently: Take 20 mg by mouth daily ), Disp: 90 tablet, Rfl: 3    leflunomide (ARAVA) 20 MG tablet, TAKE ONE TABLET BY MOUTH DAILY, Disp: , Rfl: 11    potassium chloride (KLOR-CON) 20 MEQ packet, Take 20 mEq by mouth 2 times daily (Patient taking differently: Take 20 mEq by mouth daily ), Disp: 180 each, Rfl: 3    furosemide (LASIX) 20 MG tablet, TAKE ONE TABLET BY MOUTH TWO TIMES A DAY, Disp: 60 tablet, Rfl: 3    carvedilol (COREG) 3.125 MG tablet, Take 3.125 mg by mouth 2 times daily (with meals) , Disp: , Rfl:     metFORMIN (GLUCOPHAGE) 500 MG tablet, Take 500 mg by mouth 2 times daily (with meals) , Disp: , Rfl:     losartan (COZAAR) 50 MG tablet, Take 50 mg by mouth daily , Disp: , Rfl:     pantoprazole (PROTONIX) 40 MG tablet, Take 40 mg by mouth daily , Disp: , Rfl:     predniSONE (DELTASONE) 10 MG tablet, Take 10 mg by mouth daily , Disp: , Rfl:     sildenafil (REVATIO) 20 MG tablet, Take 20 mg by mouth 3 times daily , Disp: , Rfl:     escitalopram (LEXAPRO) 20 MG tablet, Take 20 mg by mouth daily , Disp: , Rfl:   salmeterol (SEREVENT) 50 MCG/DOSE diskus inhaler, Inhale 2 puffs into the lungs daily , Disp: , Rfl:     hydroxychloroquine (PLAQUENIL) 200 MG tablet, Take 1 tablet by mouth 2 times daily. , Disp: 60 tablet, Rfl: 0    ascorbic acid (VITAMIN C) 1000 MG tablet, Take 1,000 mg by mouth daily, Disp: , Rfl:     Cholecalciferol 50 MCG (2000 UT) CAPS, Take 2,000 Units by mouth daily, Disp: , Rfl:       Physical Exam:  /68   Pulse 100   Resp 22   Ht 5' (1.524 m)   Wt 214 lb (97.1 kg)   LMP 11/23/2002   BMI 41.79 kg/m²   Wt Readings from Last 3 Encounters:   01/19/21 214 lb (97.1 kg)   07/28/20 207 lb (93.9 kg)   05/19/20 205 lb 12.8 oz (93.4 kg)     Appearance: Awake, alert and oriented x 3, no acute respiratory distress. On nasal cannula  Skin: Intact, no rash  Head: Normocephalic, atraumatic  Eyes: EOMI, no conjunctival erythema  ENMT: No pharyngeal erythema, MMM, no rhinorrhea  Neck: Supple, no elevated JVP, no carotid bruits  Lungs: Diminished but clear  Cardiac: Regular rhythm with a normal rate, no appreciable murmurs  Abdomen: Soft, nontender, +bowel sounds  Extremities: Moves all extremities x 4, trace lower extremity edema  Neurologic: No focal motor deficits apparent, normal mood and affect, alert and oriented x 3  Peripheral Pulses: Intact posterior tibial pulses bilaterally    Laboratory Tests:  Lab Results   Component Value Date    CREATININE 1.17 (H) 05/18/2020    BUN 21 05/18/2020     05/18/2020    K 4.0 05/18/2020     05/18/2020    CO2 31 05/18/2020     Lab Results   Component Value Date    MG 1.9 11/29/2012     Lab Results   Component Value Date    WBC 11.5 (H) 01/24/2020    HGB 12.0 01/24/2020    HCT 40.6 01/24/2020    MCV 89.8 01/24/2020     01/24/2020         No results found for: EAG    Cardiac Tests:  ECG: Sinus/ectopic atrial rhythm with abnormal P axis. 74 bpm.  Left anterior fascicular block.     Echocardiogram:   Echocardiogram: 6/19/2017 01) Normal left ventricle size. Wall thickness is increased consistent with      left ventricular hypertrophy. The left ventricle has normal systolic       function. The estimated left ventricular ejection fraction is 55-60%. 02) Normal right ventricle size.  The right ventricle has normal function. 03) The left atrium is normal.  04) Diastolic function could not be assessed due to indeterminant data. 05) The mitral inflow E to annular E ratio is 16 (> 15 may be consistent      with increased left atrial pressure). 06) As compared to 06/16/2016 : There is no significant change. Echocardiogram June/2020  Normal LV function EF 60% with stage I diastolic dysfunction  Normal RV function  Mild left atrial dilation  RVSP 50 mmHg  Small circumferential pericardial effusion    Stress test:  1/31/2018- normal stress perfusion scan with breast attenuation artifact. Cardiac catheterization:   4/2019  CONCLUSIONS:  1. Coronary artery disease. a. Left main, no significant disease. b.  No significant disease. c.  LCX:  20% to 30% disease. d.  RCA:  50% proximal/mid vessel disease with IFRs of 1.01, 1.01, and  1.02, and FFR of 0.94 (lesion not hemodynamically significant). 2. Mild aneurysmal dilatation of the ascending aorta  3. Normal left ventricular size and systolic function. 4. Mildly elevated LVedp       ASSESSMENT / PLAN:    1. Chronic HFpEF, compensated  2. NYHA class III  3. WHO group 2 and 3 pulmonary hypertension, on sildenafil monotherapy  4. Chronic hypoxic respiratory failure, on continuous O2  5. Mild nonobstructive coronary artery disease  6. Small pericardial effusion  7. Left anterior fascicular block  8. Chronic hypoxic respiratory failure on continuous O2  9.  Comorbid disease: Hypertension, type 2 diabetes, rheumatoid arthritis, obstructive sleep apnea (patient denies), obesity BMI 40.4 kg/m², COPD    Recommendations:

## 2021-02-02 ENCOUNTER — HOSPITAL ENCOUNTER (OUTPATIENT)
Dept: HOSPITAL 83 - LAB | Age: 66
Discharge: HOME | End: 2021-02-02
Attending: INTERNAL MEDICINE
Payer: MEDICARE

## 2021-02-02 DIAGNOSIS — R70.0: ICD-10-CM

## 2021-02-02 DIAGNOSIS — R06.02: ICD-10-CM

## 2021-02-02 DIAGNOSIS — R53.81: ICD-10-CM

## 2021-02-02 DIAGNOSIS — R79.82: ICD-10-CM

## 2021-02-02 DIAGNOSIS — M06.9: ICD-10-CM

## 2021-02-02 DIAGNOSIS — I51.7: Primary | ICD-10-CM

## 2021-02-02 DIAGNOSIS — J96.10: ICD-10-CM

## 2021-02-02 DIAGNOSIS — E11.9: ICD-10-CM

## 2021-02-02 DIAGNOSIS — R79.89: ICD-10-CM

## 2021-02-02 DIAGNOSIS — I10: ICD-10-CM

## 2021-02-02 LAB
ALBUMIN SERPL-MCNC: 3.1 GM/DL (ref 3.1–4.5)
ALP SERPL-CCNC: 51 U/L (ref 45–117)
ALT SERPL W P-5'-P-CCNC: 19 U/L (ref 12–78)
AST SERPL-CCNC: 9 IU/L (ref 3–35)
BASOPHILS # BLD AUTO: 0.1 10*3/UL (ref 0–0.1)
BASOPHILS NFR BLD AUTO: 0.5 % (ref 0–1)
BUN SERPL-MCNC: 23 MG/DL (ref 7–24)
CHLORIDE SERPL-SCNC: 102 MMOL/L (ref 98–107)
CREAT SERPL-MCNC: 1.22 MG/DL (ref 0.55–1.02)
EOSINOPHIL # BLD AUTO: 0 10*3/UL (ref 0–0.4)
EOSINOPHIL # BLD AUTO: 0.4 % (ref 1–4)
ERYTHROCYTE [DISTWIDTH] IN BLOOD BY AUTOMATED COUNT: 15.3 % (ref 0–14.5)
HCT VFR BLD AUTO: 38.2 % (ref 37–47)
LYMPHOCYTES # BLD AUTO: 1.3 10*3/UL (ref 1.3–4.4)
LYMPHOCYTES NFR BLD AUTO: 11.6 % (ref 27–41)
MCH RBC QN AUTO: 26 PG (ref 27–31)
MCHC RBC AUTO-ENTMCNC: 29.3 G/DL (ref 33–37)
MCV RBC AUTO: 88.8 FL (ref 81–99)
MONOCYTES # BLD AUTO: 0.6 10*3/UL (ref 0.1–1)
MONOCYTES NFR BLD MANUAL: 5.8 % (ref 3–9)
NEUT #: 8.8 10*3/UL (ref 2.3–7.9)
NEUT %: 81.1 % (ref 47–73)
NRBC BLD QL AUTO: 0 10*3/UL (ref 0–0)
PLATELET # BLD AUTO: 273 10*3/UL (ref 130–400)
PMV BLD AUTO: 9.6 FL (ref 9.6–12.3)
POTASSIUM SERPL-SCNC: 4.3 MMOL/L (ref 3.5–5.1)
PROT SERPL-MCNC: 7.1 GM/DL (ref 6.4–8.2)
RBC # BLD AUTO: 4.3 10*6/UL (ref 4.1–5.1)
SODIUM SERPL-SCNC: 138 MMOL/L (ref 136–145)
WBC NRBC COR # BLD AUTO: 10.8 10*3/UL (ref 4.8–10.8)

## 2021-08-23 ENCOUNTER — HOSPITAL ENCOUNTER (OUTPATIENT)
Dept: HOSPITAL 83 - LAB | Age: 66
Discharge: HOME | End: 2021-08-23
Attending: INTERNAL MEDICINE
Payer: MEDICARE

## 2021-08-23 DIAGNOSIS — I50.33: Primary | ICD-10-CM

## 2021-08-23 DIAGNOSIS — I27.20: ICD-10-CM

## 2021-08-23 LAB
BUN SERPL-MCNC: 43 MG/DL (ref 7–24)
CHLORIDE SERPL-SCNC: 101 MMOL/L (ref 98–107)
CREAT SERPL-MCNC: 2.17 MG/DL (ref 0.55–1.02)
POTASSIUM SERPL-SCNC: 4.9 MMOL/L (ref 3.5–5.1)
SODIUM SERPL-SCNC: 138 MMOL/L (ref 136–145)

## 2021-10-05 ENCOUNTER — HOSPITAL ENCOUNTER (OUTPATIENT)
Dept: HOSPITAL 83 - RAD | Age: 66
Discharge: HOME | End: 2021-10-05
Attending: INTERNAL MEDICINE
Payer: MEDICARE

## 2021-10-05 DIAGNOSIS — Z78.0: ICD-10-CM

## 2021-10-05 DIAGNOSIS — M85.852: Primary | ICD-10-CM

## 2021-12-06 ENCOUNTER — HOSPITAL ENCOUNTER (OUTPATIENT)
Dept: HOSPITAL 83 - LAB | Age: 66
Discharge: HOME | End: 2021-12-06
Attending: INTERNAL MEDICINE
Payer: MEDICARE

## 2021-12-06 DIAGNOSIS — I27.20: Primary | ICD-10-CM

## 2021-12-06 DIAGNOSIS — I50.33: ICD-10-CM

## 2021-12-06 DIAGNOSIS — I11.0: ICD-10-CM

## 2021-12-06 DIAGNOSIS — I42.8: ICD-10-CM

## 2021-12-06 LAB
BUN SERPL-MCNC: 31 MG/DL (ref 7–24)
CHLORIDE SERPL-SCNC: 98 MMOL/L (ref 98–107)
CREAT SERPL-MCNC: 2.29 MG/DL (ref 0.55–1.02)
POTASSIUM SERPL-SCNC: 4.8 MMOL/L (ref 3.5–5.1)
SODIUM SERPL-SCNC: 135 MMOL/L (ref 136–145)

## 2022-10-03 ENCOUNTER — HOSPITAL ENCOUNTER (OUTPATIENT)
Dept: HOSPITAL 83 - LAB | Age: 67
Discharge: HOME | End: 2022-10-03
Attending: INTERNAL MEDICINE
Payer: MEDICARE

## 2022-10-03 DIAGNOSIS — Z13.228: ICD-10-CM

## 2022-10-03 DIAGNOSIS — I50.30: Primary | ICD-10-CM

## 2022-10-03 DIAGNOSIS — R73.09: ICD-10-CM

## 2022-10-03 DIAGNOSIS — Z13.6: ICD-10-CM

## 2022-10-03 DIAGNOSIS — Z13.29: ICD-10-CM

## 2022-10-03 DIAGNOSIS — Z13.0: ICD-10-CM

## 2022-10-03 DIAGNOSIS — Z13.1: ICD-10-CM

## 2022-10-03 DIAGNOSIS — Z13.9: ICD-10-CM

## 2022-10-03 DIAGNOSIS — Z13.220: ICD-10-CM

## 2022-10-03 DIAGNOSIS — Z13.89: ICD-10-CM

## 2022-10-03 LAB
ALP SERPL-CCNC: 63 U/L (ref 45–117)
ALT SERPL W P-5'-P-CCNC: 19 U/L (ref 12–78)
AST SERPL-CCNC: 12 IU/L (ref 3–35)
BUN SERPL-MCNC: 25 MG/DL (ref 7–24)
CHLORIDE SERPL-SCNC: 105 MMOL/L (ref 98–107)
CREAT SERPL-MCNC: 1.9 MG/DL (ref 0.55–1.02)
POTASSIUM SERPL-SCNC: 4.1 MMOL/L (ref 3.5–5.1)
PROT SERPL-MCNC: 7.5 GM/DL (ref 6.4–8.2)
SODIUM SERPL-SCNC: 141 MMOL/L (ref 136–145)

## 2023-05-04 ENCOUNTER — HOSPITAL ENCOUNTER (OUTPATIENT)
Dept: HOSPITAL 83 - LAB | Age: 68
Discharge: HOME | End: 2023-05-04
Attending: INTERNAL MEDICINE
Payer: MEDICARE

## 2023-05-04 DIAGNOSIS — I13.0: Primary | ICD-10-CM

## 2023-05-04 DIAGNOSIS — E11.22: ICD-10-CM

## 2023-05-04 DIAGNOSIS — K31.84: ICD-10-CM

## 2023-05-04 DIAGNOSIS — I50.32: ICD-10-CM

## 2023-05-04 DIAGNOSIS — N18.31: ICD-10-CM

## 2023-05-04 LAB
BUN SERPL-MCNC: 29 MG/DL (ref 9–23)
CHLORIDE SERPL-SCNC: 97 MMOL/L (ref 98–107)
POTASSIUM SERPL-SCNC: 4.7 MMOL/L (ref 3.4–5.1)

## 2023-06-12 ENCOUNTER — HOSPITAL ENCOUNTER (OUTPATIENT)
Dept: HOSPITAL 83 - MRI | Age: 68
Discharge: HOME | End: 2023-06-12
Attending: INTERNAL MEDICINE
Payer: MEDICARE

## 2023-06-12 DIAGNOSIS — M48.061: ICD-10-CM

## 2023-06-12 DIAGNOSIS — R26.89: ICD-10-CM

## 2023-06-12 DIAGNOSIS — M51.26: Primary | ICD-10-CM

## 2023-06-12 DIAGNOSIS — R42: ICD-10-CM

## 2023-06-12 DIAGNOSIS — M47.817: ICD-10-CM

## 2023-06-12 DIAGNOSIS — I27.20: ICD-10-CM

## 2023-06-28 ENCOUNTER — HOSPITAL ENCOUNTER (OUTPATIENT)
Dept: HOSPITAL 83 - MAMMO | Age: 68
Discharge: HOME | End: 2023-06-28
Attending: INTERNAL MEDICINE
Payer: MEDICARE

## 2023-06-28 DIAGNOSIS — N64.4: Primary | ICD-10-CM

## 2023-10-31 ENCOUNTER — HOSPITAL ENCOUNTER (OUTPATIENT)
Dept: HOSPITAL 83 - US | Age: 68
Discharge: HOME | End: 2023-10-31
Attending: INTERNAL MEDICINE
Payer: MEDICARE

## 2023-10-31 DIAGNOSIS — I65.23: Primary | ICD-10-CM

## 2023-10-31 DIAGNOSIS — R42: ICD-10-CM

## 2024-07-24 ENCOUNTER — HOSPITAL ENCOUNTER (INPATIENT)
Dept: HOSPITAL 83 - ED | Age: 69
LOS: 4 days | DRG: 871 | End: 2024-07-28
Attending: INTERNAL MEDICINE | Admitting: INTERNAL MEDICINE
Payer: MEDICARE

## 2024-07-24 VITALS — DIASTOLIC BLOOD PRESSURE: 67 MMHG

## 2024-07-24 VITALS — DIASTOLIC BLOOD PRESSURE: 65 MMHG

## 2024-07-24 VITALS — DIASTOLIC BLOOD PRESSURE: 88 MMHG

## 2024-07-24 VITALS — HEIGHT: 60 IN | BODY MASS INDEX: 31.83 KG/M2 | WEIGHT: 162.13 LBS

## 2024-07-24 VITALS — DIASTOLIC BLOOD PRESSURE: 72 MMHG | SYSTOLIC BLOOD PRESSURE: 100 MMHG

## 2024-07-24 DIAGNOSIS — J96.01: ICD-10-CM

## 2024-07-24 DIAGNOSIS — F19.10: ICD-10-CM

## 2024-07-24 DIAGNOSIS — L03.116: ICD-10-CM

## 2024-07-24 DIAGNOSIS — I27.20: ICD-10-CM

## 2024-07-24 DIAGNOSIS — M54.50: ICD-10-CM

## 2024-07-24 DIAGNOSIS — E11.22: ICD-10-CM

## 2024-07-24 DIAGNOSIS — M06.9: ICD-10-CM

## 2024-07-24 DIAGNOSIS — D64.9: ICD-10-CM

## 2024-07-24 DIAGNOSIS — Z83.6: ICD-10-CM

## 2024-07-24 DIAGNOSIS — I50.33: ICD-10-CM

## 2024-07-24 DIAGNOSIS — I21.4: ICD-10-CM

## 2024-07-24 DIAGNOSIS — J44.1: ICD-10-CM

## 2024-07-24 DIAGNOSIS — R74.01: ICD-10-CM

## 2024-07-24 DIAGNOSIS — I25.10: ICD-10-CM

## 2024-07-24 DIAGNOSIS — Z83.3: ICD-10-CM

## 2024-07-24 DIAGNOSIS — E78.2: ICD-10-CM

## 2024-07-24 DIAGNOSIS — N18.32: ICD-10-CM

## 2024-07-24 DIAGNOSIS — I11.0: ICD-10-CM

## 2024-07-24 DIAGNOSIS — F14.129: ICD-10-CM

## 2024-07-24 DIAGNOSIS — Z51.5: ICD-10-CM

## 2024-07-24 DIAGNOSIS — E11.649: ICD-10-CM

## 2024-07-24 DIAGNOSIS — G89.29: ICD-10-CM

## 2024-07-24 DIAGNOSIS — L03.115: ICD-10-CM

## 2024-07-24 DIAGNOSIS — Z82.49: ICD-10-CM

## 2024-07-24 DIAGNOSIS — I95.9: ICD-10-CM

## 2024-07-24 DIAGNOSIS — N17.0: ICD-10-CM

## 2024-07-24 DIAGNOSIS — Z66: ICD-10-CM

## 2024-07-24 DIAGNOSIS — R33.9: ICD-10-CM

## 2024-07-24 DIAGNOSIS — M48.061: ICD-10-CM

## 2024-07-24 DIAGNOSIS — A41.9: Primary | ICD-10-CM

## 2024-07-24 LAB
ALP SERPL-CCNC: 76 U/L (ref 46–116)
ALT SERPL W P-5'-P-CCNC: 54 U/L (ref 5–49)
APTT PPP: 26.5 SECONDS (ref 20–32.1)
BACTERIA #/AREA URNS HPF: (no result) /[HPF]
BASOPHILS # BLD AUTO: 0 10*3/UL (ref 0–0.1)
BASOPHILS NFR BLD AUTO: 0.5 % (ref 0–1)
BUN SERPL-MCNC: 24 MG/DL (ref 9–23)
CHLORIDE SERPL-SCNC: 101 MMOL/L (ref 98–107)
EOSINOPHIL # BLD AUTO: 0 % (ref 1–4)
EOSINOPHIL # BLD AUTO: 0 10*3/UL (ref 0–0.4)
EPI CELLS #/AREA URNS HPF: (no result) /[HPF]
ERYTHROCYTE [DISTWIDTH] IN BLOOD BY AUTOMATED COUNT: 19.4 % (ref 0–14.5)
HCT VFR BLD AUTO: 35.9 % (ref 37–47)
LIPASE SERPL-CCNC: 28 U/L (ref 12–53)
LYMPHOCYTES # BLD AUTO: 1 10*3/UL (ref 1.3–4.4)
LYMPHOCYTES NFR BLD AUTO: 12.5 % (ref 27–41)
MCH RBC QN AUTO: 22.3 PG (ref 27–31)
MCHC RBC AUTO-ENTMCNC: 28.4 G/DL (ref 33–37)
MCV RBC AUTO: 78.4 FL (ref 81–99)
MONOCYTES # BLD AUTO: 0.6 10*3/UL (ref 0.1–1)
MONOCYTES NFR BLD MANUAL: 6.9 % (ref 3–9)
NEUT #: 6.5 10*3/UL (ref 2.3–7.9)
NEUT %: 79.4 % (ref 47–73)
NRBC BLD QL AUTO: 0 10*3/UL (ref 0–0)
PH UR STRIP: 5.5 [PH] (ref 4.5–8)
PLATELET # BLD AUTO: 252 10*3/UL (ref 130–400)
PMV BLD AUTO: 9.2 FL (ref 9.6–12.3)
POTASSIUM SERPL-SCNC: 4.4 MMOL/L (ref 3.4–5.1)
PROT SERPL-MCNC: 6.4 GM/DL (ref 6–8)
RBC # BLD AUTO: 4.58 10*6/UL (ref 4.1–5.1)
SP GR UR: 1.01 (ref 1–1.03)
UROBILINOGEN UR STRIP-MCNC: 0.2 E.U./DL (ref 0–1)
WBC #/AREA URNS HPF: (no result) WBC/HPF (ref 0–5)
WBC NRBC COR # BLD AUTO: 8.2 10*3/UL (ref 4.8–10.8)

## 2024-07-25 VITALS — SYSTOLIC BLOOD PRESSURE: 110 MMHG | DIASTOLIC BLOOD PRESSURE: 71 MMHG

## 2024-07-25 VITALS — DIASTOLIC BLOOD PRESSURE: 76 MMHG | SYSTOLIC BLOOD PRESSURE: 121 MMHG

## 2024-07-25 VITALS — DIASTOLIC BLOOD PRESSURE: 74 MMHG

## 2024-07-25 VITALS — SYSTOLIC BLOOD PRESSURE: 165 MMHG | DIASTOLIC BLOOD PRESSURE: 82 MMHG

## 2024-07-25 VITALS — DIASTOLIC BLOOD PRESSURE: 92 MMHG

## 2024-07-25 VITALS — SYSTOLIC BLOOD PRESSURE: 125 MMHG | DIASTOLIC BLOOD PRESSURE: 77 MMHG

## 2024-07-25 VITALS — SYSTOLIC BLOOD PRESSURE: 117 MMHG | DIASTOLIC BLOOD PRESSURE: 75 MMHG

## 2024-07-25 VITALS — SYSTOLIC BLOOD PRESSURE: 118 MMHG | DIASTOLIC BLOOD PRESSURE: 74 MMHG

## 2024-07-25 LAB
BASOPHILS # BLD AUTO: 0 10*3/UL (ref 0–0.1)
BASOPHILS NFR BLD AUTO: 0.3 % (ref 0–1)
BUN SERPL-MCNC: 25 MG/DL (ref 9–23)
CHLORIDE SERPL-SCNC: 103 MMOL/L (ref 98–107)
EOSINOPHIL # BLD AUTO: 0 % (ref 1–4)
EOSINOPHIL # BLD AUTO: 0 10*3/UL (ref 0–0.4)
ERYTHROCYTE [DISTWIDTH] IN BLOOD BY AUTOMATED COUNT: 19.1 % (ref 0–14.5)
HCT VFR BLD AUTO: 35 % (ref 37–47)
LYMPHOCYTES # BLD AUTO: 0.9 10*3/UL (ref 1.3–4.4)
LYMPHOCYTES NFR BLD AUTO: 11.8 % (ref 27–41)
MCH RBC QN AUTO: 22.6 PG (ref 27–31)
MCHC RBC AUTO-ENTMCNC: 28.6 G/DL (ref 33–37)
MCV RBC AUTO: 79 FL (ref 81–99)
MONOCYTES # BLD AUTO: 0.5 10*3/UL (ref 0.1–1)
MONOCYTES NFR BLD MANUAL: 6.5 % (ref 3–9)
NEUT #: 6.3 10*3/UL (ref 2.3–7.9)
NEUT %: 81.1 % (ref 47–73)
NRBC BLD QL AUTO: 0 % (ref 0–0)
PLATELET # BLD AUTO: 244 10*3/UL (ref 130–400)
PMV BLD AUTO: 9 FL (ref 9.6–12.3)
POTASSIUM SERPL-SCNC: 4.3 MMOL/L (ref 3.4–5.1)
RBC # BLD AUTO: 4.43 10*6/UL (ref 4.1–5.1)
TROPONIN I, HIGH SENSITIVITY: 143 NG/L (ref 0–34)
WBC NRBC COR # BLD AUTO: 7.7 10*3/UL (ref 4.8–10.8)

## 2024-07-26 VITALS — DIASTOLIC BLOOD PRESSURE: 78 MMHG

## 2024-07-26 VITALS — DIASTOLIC BLOOD PRESSURE: 68 MMHG | SYSTOLIC BLOOD PRESSURE: 110 MMHG

## 2024-07-26 VITALS — DIASTOLIC BLOOD PRESSURE: 76 MMHG

## 2024-07-26 VITALS — DIASTOLIC BLOOD PRESSURE: 55 MMHG

## 2024-07-26 VITALS — DIASTOLIC BLOOD PRESSURE: 67 MMHG

## 2024-07-26 LAB
BASOPHILS # BLD AUTO: 0 10*3/UL (ref 0–0.1)
BASOPHILS NFR BLD AUTO: 0.3 % (ref 0–1)
BUN SERPL-MCNC: 22 MG/DL (ref 9–23)
CHLORIDE SERPL-SCNC: 103 MMOL/L (ref 98–107)
CREAT UR-MCNC: 19.44 MG/DL
EOSINOPHIL # BLD AUTO: 0 % (ref 1–4)
EOSINOPHIL # BLD AUTO: 0 10*3/UL (ref 0–0.4)
ERYTHROCYTE [DISTWIDTH] IN BLOOD BY AUTOMATED COUNT: 19.6 % (ref 0–14.5)
HCT VFR BLD AUTO: 33.4 % (ref 37–47)
LYMPHOCYTES # BLD AUTO: 1.8 10*3/UL (ref 1.3–4.4)
LYMPHOCYTES NFR BLD AUTO: 27.6 % (ref 27–41)
MCH RBC QN AUTO: 23 PG (ref 27–31)
MCHC RBC AUTO-ENTMCNC: 29.6 G/DL (ref 33–37)
MCV RBC AUTO: 77.7 FL (ref 81–99)
MONOCYTES # BLD AUTO: 0.7 10*3/UL (ref 0.1–1)
MONOCYTES NFR BLD MANUAL: 11.2 % (ref 3–9)
NEUT #: 4 10*3/UL (ref 2.3–7.9)
NEUT %: 60.4 % (ref 47–73)
NRBC BLD QL AUTO: 0 10*3/UL (ref 0–0)
PLATELET # BLD AUTO: 253 10*3/UL (ref 130–400)
PMV BLD AUTO: 9.6 FL (ref 9.6–12.3)
POTASSIUM SERPL-SCNC: 4.7 MMOL/L (ref 3.4–5.1)
RBC # BLD AUTO: 4.3 10*6/UL (ref 4.1–5.1)
TROPONIN I, HIGH SENSITIVITY: 132 NG/L (ref 0–34)
WBC NRBC COR # BLD AUTO: 6.6 10*3/UL (ref 4.8–10.8)

## 2024-07-27 VITALS — SYSTOLIC BLOOD PRESSURE: 100 MMHG | DIASTOLIC BLOOD PRESSURE: 48 MMHG

## 2024-07-27 VITALS — DIASTOLIC BLOOD PRESSURE: 58 MMHG

## 2024-07-27 VITALS — SYSTOLIC BLOOD PRESSURE: 70 MMHG | DIASTOLIC BLOOD PRESSURE: 43 MMHG

## 2024-07-27 VITALS — DIASTOLIC BLOOD PRESSURE: 46 MMHG

## 2024-07-27 LAB
25(OH)D3 SERPL-MCNC: 127.5 NG/ML (ref 30–100)
ALP SERPL-CCNC: 123 U/L (ref 46–116)
ALP SERPL-CCNC: 126 U/L (ref 46–116)
ALP SERPL-CCNC: 126 U/L (ref 46–116)
ALT SERPL W P-5'-P-CCNC: 1740 U/L (ref 5–49)
ALT SERPL W P-5'-P-CCNC: > 3300 U/L (ref 5–49)
ALT SERPL W P-5'-P-CCNC: > 3300 U/L (ref 5–49)
BASE EXCESS BLDA CALC-SCNC: -23.1 MMOL/L (ref -2–2)
BASE EXCESS BLDA CALC-SCNC: -23.6 MMOL/L (ref -2–2)
BASOPHILS # BLD AUTO: 0.1 10*3/UL (ref 0–0.1)
BASOPHILS NFR BLD AUTO: 0.4 % (ref 0–1)
BUN SERPL-MCNC: 26 MG/DL (ref 9–23)
BUN SERPL-MCNC: 33 MG/DL (ref 9–23)
BUN SERPL-MCNC: 33 MG/DL (ref 9–23)
BURR CELLS BLD QL SMEAR: (no result)
BURR CELLS BLD QL SMEAR: (no result)
CHLORIDE SERPL-SCNC: 97 MMOL/L (ref 98–107)
CHLORIDE SERPL-SCNC: 98 MMOL/L (ref 98–107)
CHLORIDE SERPL-SCNC: 99 MMOL/L (ref 98–107)
EOSINOPHIL # BLD AUTO: 0 % (ref 1–4)
EOSINOPHIL # BLD AUTO: 0 10*3/UL (ref 0–0.4)
ERYTHROCYTE [DISTWIDTH] IN BLOOD BY AUTOMATED COUNT: 20.4 % (ref 0–14.5)
ERYTHROCYTE [DISTWIDTH] IN BLOOD BY AUTOMATED COUNT: 20.5 % (ref 0–14.5)
ERYTHROCYTE [DISTWIDTH] IN BLOOD BY AUTOMATED COUNT: 20.6 % (ref 0–14.5)
HCT VFR BLD AUTO: 38.2 % (ref 37–47)
HCT VFR BLD AUTO: 38.4 % (ref 37–47)
HCT VFR BLD AUTO: 41.1 % (ref 37–47)
LYMPHOCYTES # BLD AUTO: 4.3 10*3/UL (ref 1.3–4.4)
LYMPHOCYTES NFR BLD AUTO: 35.5 % (ref 27–41)
MACROCYTES BLD QL SMEAR: SLIGHT
MANUAL DIFFERENTIAL PERFORMED BLD QL: YES
MANUAL DIFFERENTIAL PERFORMED BLD QL: YES
MCH RBC QN AUTO: 22.5 PG (ref 27–31)
MCH RBC QN AUTO: 22.7 PG (ref 27–31)
MCH RBC QN AUTO: 22.8 PG (ref 27–31)
MCHC RBC AUTO-ENTMCNC: 26.4 G/DL (ref 33–37)
MCHC RBC AUTO-ENTMCNC: 27.6 G/DL (ref 33–37)
MCHC RBC AUTO-ENTMCNC: 27.7 G/DL (ref 33–37)
MCV RBC AUTO: 82.2 FL (ref 81–99)
MCV RBC AUTO: 82.4 FL (ref 81–99)
MCV RBC AUTO: 85.3 FL (ref 81–99)
MONOCYTES # BLD AUTO: 1.2 10*3/UL (ref 0.1–1)
MONOCYTES NFR BLD MANUAL: 9.6 % (ref 3–9)
NEUT #: 6.4 10*3/UL (ref 2.3–7.9)
NEUT %: 53.6 % (ref 47–73)
NRBC BLD QL AUTO: 0.1 10*3/UL (ref 0–0)
NRBC BLD QL AUTO: 0.3 % (ref 0–0)
NRBC BLD QL AUTO: 0.5 % (ref 0–0)
OVALOCYTES BLD QL SMEAR: (no result)
PCO2 BLDA: 39 MMHG (ref 35–45)
PCO2 BLDA: 63 MMHG (ref 35–45)
PH BLDA: 6.84 [PH] (ref 7.35–7.45)
PH BLDA: 6.93 [PH] (ref 7.35–7.45)
PLATELET # BLD AUTO: 236 10*3/UL (ref 130–400)
PLATELET # BLD AUTO: 259 10*3/UL (ref 130–400)
PLATELET # BLD AUTO: 262 10*3/UL (ref 130–400)
PLATELET SUFFICIENCY: NORMAL
PLATELET SUFFICIENCY: NORMAL
PMV BLD AUTO: 10.2 FL (ref 9.6–12.3)
PMV BLD AUTO: 9.8 FL (ref 9.6–12.3)
PMV BLD AUTO: 9.9 FL (ref 9.6–12.3)
PO2 BLDA: 113 MMHG (ref 80–90)
PO2 BLDA: 245 MMHG (ref 80–90)
POLYCHROMASIA BLD QL SMEAR: SLIGHT
POLYCHROMASIA BLD QL SMEAR: SLIGHT
POTASSIUM SERPL-SCNC: 5.1 MMOL/L (ref 3.4–5.1)
POTASSIUM SERPL-SCNC: 5.8 MMOL/L (ref 3.4–5.1)
POTASSIUM SERPL-SCNC: 6.2 MMOL/L (ref 3.4–5.1)
PROT SERPL-MCNC: 6.5 GM/DL (ref 6–8)
PROT SERPL-MCNC: 6.7 GM/DL (ref 6–8)
PROT SERPL-MCNC: 6.9 GM/DL (ref 6–8)
RBC # BLD AUTO: 4.48 10*6/UL (ref 4.1–5.1)
RBC # BLD AUTO: 4.66 10*6/UL (ref 4.1–5.1)
RBC # BLD AUTO: 5 10*6/UL (ref 4.1–5.1)
ROULEAUX BLD QL SMEAR: SLIGHT
SAO2 % BLDA: 92 % (ref 95–97)
SAO2 % BLDA: 99 % (ref 95–97)
SCHISTOCYTES BLD QL SMEAR: (no result)
SCHISTOCYTES BLD QL SMEAR: (no result)
TOTAL CELLS COUNTED: 100 #CELLS
TOTAL CELLS COUNTED: 100 #CELLS
WBC NRBC COR # BLD AUTO: 12 10*3/UL (ref 4.8–10.8)
WBC NRBC COR # BLD AUTO: 17.9 10*3/UL (ref 4.8–10.8)
WBC NRBC COR # BLD AUTO: 20.2 10*3/UL (ref 4.8–10.8)

## 2024-07-27 PROCEDURE — 0BH18EZ INSERTION OF ENDOTRACHEAL AIRWAY INTO TRACHEA, VIA NATURAL OR ARTIFICIAL OPENING ENDOSCOPIC: ICD-10-PCS | Performed by: STUDENT IN AN ORGANIZED HEALTH CARE EDUCATION/TRAINING PROGRAM
